# Patient Record
Sex: FEMALE | Race: WHITE | NOT HISPANIC OR LATINO | Employment: FULL TIME | URBAN - METROPOLITAN AREA
[De-identification: names, ages, dates, MRNs, and addresses within clinical notes are randomized per-mention and may not be internally consistent; named-entity substitution may affect disease eponyms.]

---

## 2017-06-29 ENCOUNTER — HOSPITAL ENCOUNTER (OUTPATIENT)
Dept: MAMMOGRAPHY | Facility: CLINIC | Age: 42
Discharge: HOME | End: 2017-06-29
Attending: NURSE PRACTITIONER | Admitting: NURSE PRACTITIONER
Payer: COMMERCIAL

## 2017-06-29 DIAGNOSIS — Z12.31 VISIT FOR SCREENING MAMMOGRAM: ICD-10-CM

## 2017-06-29 PROCEDURE — 77063 BREAST TOMOSYNTHESIS BI: CPT

## 2017-06-29 PROCEDURE — G0202 SCR MAMMO BI INCL CAD: HCPCS

## 2017-09-07 ENCOUNTER — PRE VISIT (OUTPATIENT)
Dept: SURGERY | Facility: CLINIC | Age: 42
End: 2017-09-07

## 2017-09-07 NOTE — TELEPHONE ENCOUNTER
1.  Date/reason for appt:  9/12/17   Hemorroids    2.  Referring provider:  Self    3.  Call to patient (Yes / No - short description):  No told  no outside records    Records reviewed.  All records are in Epic

## 2017-09-12 ENCOUNTER — OFFICE VISIT (OUTPATIENT)
Dept: SURGERY | Facility: CLINIC | Age: 42
End: 2017-09-12

## 2017-09-12 VITALS
BODY MASS INDEX: 20.7 KG/M2 | WEIGHT: 98.6 LBS | HEIGHT: 58 IN | SYSTOLIC BLOOD PRESSURE: 103 MMHG | DIASTOLIC BLOOD PRESSURE: 61 MMHG | HEART RATE: 81 BPM | TEMPERATURE: 98.6 F | OXYGEN SATURATION: 100 %

## 2017-09-12 DIAGNOSIS — K60.2 ANAL FISSURE: Primary | ICD-10-CM

## 2017-09-12 RX ORDER — ESCITALOPRAM OXALATE 10 MG/1
20 TABLET ORAL
COMMUNITY
Start: 2005-01-01 | End: 2024-01-11

## 2017-09-12 ASSESSMENT — ENCOUNTER SYMPTOMS
RECTAL PAIN: 1
INSOMNIA: 0
HOT FLASHES: 0
DECREASED LIBIDO: 1
NAUSEA: 0
DIARRHEA: 0
BLOATING: 0
BLOOD IN STOOL: 0
BOWEL INCONTINENCE: 0
HEARTBURN: 0
RECTAL BLEEDING: 1
DECREASED CONCENTRATION: 1
CONSTIPATION: 0
PANIC: 0
DEPRESSION: 1
VOMITING: 0
JAUNDICE: 0
ABDOMINAL PAIN: 0
NERVOUS/ANXIOUS: 1

## 2017-09-12 ASSESSMENT — PAIN SCALES - GENERAL: PAINLEVEL: MILD PAIN (3)

## 2017-09-12 NOTE — MR AVS SNAPSHOT
After Visit Summary   9/12/2017    Yolette Hayward    MRN: [id number]           Patient Information     Date Of Birth          1975        Visit Information        Provider Department      9/12/2017 2:30 PM Essence Hodge APRN [facility] Colon and Rectal Surgery        Today's Diagnoses     Anal fissure    -  1      Care Instructions    1. Diltiazem ointment 2% to be applied 3 times daily for 8 weeks  2. Fiber supplement such as Metamcuil, Citrucel, or Benefiber once to twice a day  3. MiraLax or Milk of Magnesia if still constipated  4. Drink 10 glasses of water a day  5. Tylenol, ibuprofen, and warm tub baths/sitz baths for pain  6. Follow up in 8 weeks. Follow up sooner if symptoms do not improve after 4 weeks.            Follow-ups after your visit        Who to contact     Please call your clinic at [phone] to:    Ask questions about your health    Make or cancel appointments    Discuss your medicines    Learn about your test results    Speak to your doctor   If you have compliments or concerns about an experience at your clinic, or if you wish to file a complaint, please contact [facility] Patient Relations at [phone] or email us at [email or web address]         Additional Information About Your Visit        [employer] Information     [employer] gives you secure access to your electronic health record. If you see a primary care provider, you can also send messages to your care team and make appointments. If you have questions, please call your primary care clinic.  If you do not have a primary care provider, please call [phone] and they will assist you.      [employer] is an electronic gateway that provides easy, online access to your medical records. With [employer], you can request a clinic appointment, read your test results, renew a prescription or communicate with your care team.     To access your existing account, please contact  "your [facility] or call [phone] for assistance.        Care EveryWhere ID     This is your Care EveryWhere ID. This could be used by other organizations to access your [place] medical records  [id number]        Your Vitals Were     Pulse Temperature Height Pulse Oximetry BMI (Body Mass Index)       81 98.6  F (37  C) (Oral) 4' 10\" 100% 20.61 kg/m2        Blood Pressure from Last 3 Encounters:   09/12/17 103/61   07/19/16 104/66    Weight from Last 3 Encounters:   09/12/17 98 lb 9.6 oz              Today, you had the following     No orders found for display         Today's Medication Changes          These changes are accurate as of: 9/12/17  2:52 PM.  If you have any questions, ask your nurse or doctor.               Start taking these medicines.        Dose/Directions    diltiazem 2% in PLO cream (FV COMPOUNDED) 2% Gel   Used for:  Anal fissure   Started by:  Essence Hodge, APRN CNP        To anal opening three times daily.  Use a pea-sized amount.  Store at room temperature.   Quantity:  60 g   Refills:  0            Where to get your medicines      These medications were sent to [facility] - [address]  [address] [facility] [address]     Phone:  [phone]     diltiazem 2% in PLO cream (FV COMPOUNDED) 2% Gel                Primary Care Provider Office Phone # Fax #    Rose GUERITA Russ [phone]       [facility] [address] [facility] [address]  [facility] [address]        Equal Access to Services     HORACE PRAKASH AH: Hadii aad ku hadasho Soomaali, waaxda luqadaha, qaybta kaalmada adetulioyamaggie, lo brito. So [facility] [phone].    ATENCIÓN: Si habla español, tiene a wong disposición servicios gratuitos de asistencia lingüística. Llame al [phone].    We comply with applicable federal civil rights laws and [place] laws. We do not discriminate " on the basis of race, color, national origin, age, disability sex, sexual orientation or gender identity.            Thank you!     Thank you for choosing [facility] COLON AND RECTAL SURGERY  for your care. Our goal is always to provide you with excellent care. Hearing back from our patients is one way we can continue to improve our services. Please take a few minutes to complete the written survey that you may receive in the mail after your visit with us. Thank you!             Your Updated Medication List - Protect others around you: Learn how to safely use, store and throw away your medicines at www.disposemymeds.org.          This list is accurate as of: 9/12/17  2:52 PM.  Always use your most recent med list.                   Brand Name Dispense Instructions for use Diagnosis    diltiazem 2% in PLO cream (FV COMPOUNDED) 2% Gel     60 g    To anal opening three times daily.  Use a pea-sized amount.  Store at room temperature.    Anal fissure       escitalopram 10 MG tablet    LEXAPRO          FISH OIL PO

## 2017-09-12 NOTE — PATIENT INSTRUCTIONS
1. Diltiazem ointment 2% to be applied 3 times daily for 8 weeks  2. Fiber supplement such as Metamcuil, Citrucel, or Benefiber once to twice a day  3. MiraLax or Milk of Magnesia if still constipated  4. Drink 10 glasses of water a day  5. Tylenol, ibuprofen, and warm tub baths/sitz baths for pain  6. Follow up in 8 weeks. Follow up sooner if symptoms do not improve after 4 weeks.

## 2017-09-12 NOTE — NURSING NOTE
"Chief Complaint   Patient presents with     Clinic Care Coordination - Initial     New pt consult, painful hemorhoids.        Vitals:    09/12/17 1432   BP: 103/61   BP Location: Left arm   Patient Position: Chair   Cuff Size: Adult Regular   Pulse: 81   Temp: 98.6  F (37  C)   TempSrc: Oral   SpO2: 100%   Weight: 98 lb 9.6 oz   Height: 4' 10\"       Body mass index is 20.61 kg/(m^2).    Aminata SORIANO LPN                  "

## 2017-09-12 NOTE — LETTER
2017      RE: Yolette Hayward  [address]  [facility] [address]       Colon and Rectal Surgery Consult Clinic Note    Date: 2017     Referring provider:  Referred Self, MD  No address on file     RE: Yolette Hayward  : 1975  YOSHI: 2017    Yolette Hayward is a very pleasant 42 year old female without a significant past medical history with a recent diagnosis of anal pain.  Given these findings they were subsequently sent to the Colon and Rectal Surgery Clinic for an opinion on this and a new patient consultation.     Yolette reports developing pain with bowel movements in  when she was doing a lot of biking and with straining with bowel movements. She developed a small skin tag at that point. Her pain then resolved but recurred again when she was pregnant about 2 years ago. She did not give birth but had an . She states that since the pregnancy she has had some sharp pain with bowel movements and then pain that lasts for a few hours after bowel movements. She has tried increasing her dietary fiber and using a hemorrhoid cream without improvement in symptoms. She notices a small amount of bright red blood with bowel movements but states that this is very minimal. Her bowel movements are now quite soft.    Assessment/Plan: 42 year old female with anal fissure. On exam she has an anterior midline anal fissure with associated skin tag. Superficial posterior midline anal fissure as well. Discussed the importance of keeping stools soft and easy to pass while healing fissure. Recommended she continue on a high-fiber diet, drink a lot of water, and add in a daily fiber supplement. She can additionally take a laxative as needed for hard stools. Will treat with topical diltiazem with follow up in 8 weeks. Discussed that it may take several weeks for symptoms to improve. If no improvement is noted after 4 weeks, return to clinic and discuss further intervention such as Botox injections or  "sphincterotomy.   Patient's questions were answered to her stated satisfaction and she is in agreement with this plan.    Medical history:  No past medical history on file.    Surgical history:  No past surgical history on file.    Problem list:  There are no active problems to display for this patient.      Medications:  Current Outpatient Prescriptions   Medication Sig Dispense Refill     escitalopram (LEXAPRO) 10 MG tablet        Omega-3 Fatty Acids (FISH OIL PO)        diltiazem 2% in PLO cream, FV COMPOUNDED, 2% GEL To anal opening three times daily.  Use a pea-sized amount.  Store at room temperature. 60 g 0       Allergies:  Allergies   Allergen Reactions     Wellbutrin [Bupropion]        Family history:  No family history on file.    Social history:  Social History   Substance Use Topics     Smoking status: Never Smoker     Smokeless tobacco: Not on file     Alcohol use Not on file    Marital status: single.  Occupation: lab tech    Nursing Notes:   Eddie Marx LPN  9/12/2017  2:36 PM  Signed  Chief Complaint   Patient presents with     Clinic Care Coordination - Initial     New pt consult, painful hemorhoids.        Vitals:    09/12/17 1432   BP: 103/61   BP Location: Left arm   Patient Position: Chair   Cuff Size: Adult Regular   Pulse: 81   Temp: 98.6  F (37  C)   TempSrc: Oral   SpO2: 100%   Weight: 98 lb 9.6 oz   Height: 4' 10\"       Body mass index is 20.61 kg/(m^2).    Aminata SORINAO LPN                     Physical Examination:  /61 (BP Location: Left arm, Patient Position: Chair, Cuff Size: Adult Regular)  Pulse 81  Temp 98.6  F (37  C) (Oral)  Ht 4' 10\"  Wt 98 lb 9.6 oz  SpO2 100%  BMI 20.61 kg/m2  General: alert, oriented, in no acute distress, sitting comfortably  HEENT: mucous membranes moist  Perianal external examination:  Perianal skin: Intact with no excoriation or lichenification.  Lesions: No evidence of an external lesion, nodularity, or induration in the perianal " region.  Eversion of buttocks: There was evidence of an anal fissure. Details: anterior midline anal fissure with associated skin tag. Superficial posterior midline anal fissure. No active bleeding.  Digital rectal examination: Was deferred in order not to cause further trauma    Anoscopy: Was deferred in order not to cause further trauma    Total face to face time was 20 minutes, >50% counseling.    GUERITA Gonzalez, NP-C  Colon and Rectal Surgery   [facility]    This note was created using speech recognition software and may contain unintended word substitutions.      GUERITA Gonzalez CNP

## 2017-09-12 NOTE — PROGRESS NOTES
Colon and Rectal Surgery Consult Clinic Note    Date: 2017     Referring provider:  Referred Self, MD  No address on file     RE: Yolette Hayward  : 1975  YOSHI: 2017    Yolette Hayward is a very pleasant 42 year old female without a significant past medical history with a recent diagnosis of anal pain.  Given these findings they were subsequently sent to the Colon and Rectal Surgery Clinic for an opinion on this and a new patient consultation.     Yolette reports developing pain with bowel movements in  when she was doing a lot of biking and with straining with bowel movements. She developed a small skin tag at that point. Her pain then resolved but recurred again when she was pregnant about 2 years ago. She did not give birth but had an . She states that since the pregnancy she has had some sharp pain with bowel movements and then pain that lasts for a few hours after bowel movements. She has tried increasing her dietary fiber and using a hemorrhoid cream without improvement in symptoms. She notices a small amount of bright red blood with bowel movements but states that this is very minimal. Her bowel movements are now quite soft.    Assessment/Plan: 42 year old female with anal fissure. On exam she has an anterior midline anal fissure with associated skin tag. Superficial posterior midline anal fissure as well. Discussed the importance of keeping stools soft and easy to pass while healing fissure. Recommended she continue on a high-fiber diet, drink a lot of water, and add in a daily fiber supplement. She can additionally take a laxative as needed for hard stools. Will treat with topical diltiazem with follow up in 8 weeks. Discussed that it may take several weeks for symptoms to improve. If no improvement is noted after 4 weeks, return to clinic and discuss further intervention such as Botox injections or sphincterotomy.   Patient's questions were answered to her stated satisfaction and she is  "in agreement with this plan.    Medical history:  No past medical history on file.    Surgical history:  No past surgical history on file.    Problem list:  There are no active problems to display for this patient.      Medications:  Current Outpatient Prescriptions   Medication Sig Dispense Refill     escitalopram (LEXAPRO) 10 MG tablet        Omega-3 Fatty Acids (FISH OIL PO)        diltiazem 2% in PLO cream, FV COMPOUNDED, 2% GEL To anal opening three times daily.  Use a pea-sized amount.  Store at room temperature. 60 g 0       Allergies:  Allergies   Allergen Reactions     Wellbutrin [Bupropion]        Family history:  No family history on file.    Social history:  Social History   Substance Use Topics     Smoking status: Never Smoker     Smokeless tobacco: Not on file     Alcohol use Not on file    Marital status: single.  Occupation: lab tech    Nursing Notes:   Eddie Marx LPN  9/12/2017  2:36 PM  Signed  Chief Complaint   Patient presents with     Clinic Care Coordination - Initial     New pt consult, painful hemorhoids.        Vitals:    09/12/17 1432   BP: 103/61   BP Location: Left arm   Patient Position: Chair   Cuff Size: Adult Regular   Pulse: 81   Temp: 98.6  F (37  C)   TempSrc: Oral   SpO2: 100%   Weight: 98 lb 9.6 oz   Height: 4' 10\"       Body mass index is 20.61 kg/(m^2).    Aminata SORIANO LPN                     Physical Examination:  /61 (BP Location: Left arm, Patient Position: Chair, Cuff Size: Adult Regular)  Pulse 81  Temp 98.6  F (37  C) (Oral)  Ht 4' 10\"  Wt 98 lb 9.6 oz  SpO2 100%  BMI 20.61 kg/m2  General: alert, oriented, in no acute distress, sitting comfortably  HEENT: mucous membranes moist  Perianal external examination:  Perianal skin: Intact with no excoriation or lichenification.  Lesions: No evidence of an external lesion, nodularity, or induration in the perianal region.  Eversion of buttocks: There was evidence of an anal fissure. Details: anterior midline anal " fissure with associated skin tag. Superficial posterior midline anal fissure. No active bleeding.  Digital rectal examination: Was deferred in order not to cause further trauma    Anoscopy: Was deferred in order not to cause further trauma    Total face to face time was 20 minutes, >50% counseling.    GUERITA Gonzalez, NP-C  Colon and Rectal Surgery   [facility]    This note was created using speech recognition software and may contain unintended word substitutions.

## 2017-09-16 ENCOUNTER — HEALTH MAINTENANCE LETTER (OUTPATIENT)
Age: 42
End: 2017-09-16

## 2017-12-14 ENCOUNTER — OFFICE VISIT (OUTPATIENT)
Dept: SURGERY | Facility: CLINIC | Age: 42
End: 2017-12-14
Payer: COMMERCIAL

## 2017-12-14 VITALS
OXYGEN SATURATION: 96 % | WEIGHT: 103.6 LBS | HEIGHT: 58 IN | DIASTOLIC BLOOD PRESSURE: 68 MMHG | SYSTOLIC BLOOD PRESSURE: 108 MMHG | HEART RATE: 67 BPM | BODY MASS INDEX: 21.75 KG/M2

## 2017-12-14 DIAGNOSIS — K60.2 ANAL FISSURE: ICD-10-CM

## 2017-12-14 DIAGNOSIS — K59.09 OTHER CONSTIPATION: Primary | ICD-10-CM

## 2017-12-14 ASSESSMENT — PAIN SCALES - GENERAL: PAINLEVEL: NO PAIN (0)

## 2017-12-14 NOTE — MR AVS SNAPSHOT
"              After Visit Summary   12/14/2017    Yolette Hayward    MRN: [id number]           Patient Information     Date Of Birth          1975        Visit Information        Provider Department      12/14/2017 12:00 PM Essence Hodge APRN [facility]al [facility]        Today's Diagnoses     Other constipation    -  1    Anal fissure           Follow-ups after your visit        Who to contact     Please call your clinic at [phone] to:    Ask questions about your health    Make or cancel appointments    Discuss your medicines    Learn about your test results    Speak to your doctor   If you have compliments or concerns about an experience at your clinic, or if you wish to file a complaint, please contact [facility] Patient Relations at [phone] or email us at [email or web address]         Additional Information About Your Visit        MyChart Information     [employer] gives you secure access to your electronic health record. If you see a primary care provider, you can also send messages to your care team and make appointments. If you have questions, please call your primary care clinic.  If you do not have a primary care provider, please call [phone] and they will assist you.      [employer] is an electronic gateway that provides easy, online access to your medical records. With [employer], you can request a clinic appointment, read your test results, renew a prescription or communicate with your care team.     To access your existing account, please contact your [facility] or call [phone] for assistance.        Care EveryWhere ID     This is your Care EveryWhere ID. This could be used by other organizations to access your [place] medical records  [id number]        Your Vitals Were     Pulse Height Pulse Oximetry BMI (Body Mass Index)          67 4' 10\" 96% 21.65 kg/m2         Blood Pressure from Last " 3 Encounters:   12/14/17 108/68   09/12/17 103/61   07/19/16 104/66    Weight from Last 3 Encounters:   12/14/17 103 lb 9.6 oz   09/12/17 98 lb 9.6 oz              Today, you had the following     No orders found for display       Primary Care Provider Office Phone # Fax #    GUERITA Castañeda [phone]       [facility] [address]  [facility] [address]        Equal Access to Services     [facility]: Hadii aad ku hadasho Soomaali, waaxda luqadaha, qaybta kaalmada adeegyada, waxay zulmain hayjosen mayito monteiro . So [facility] [phone].    ATENCIÓN: Si habla español, tiene a wong disposición servicios gratuitos de asistencia lingüística. Llame al [phone].    We comply with applicable federal civil rights laws and [place] laws. We do not discriminate on the basis of race, color, national origin, age, disability, sex, sexual orientation, or gender identity.            Thank you!     Thank you for choosing [facility] COLON AND RECTAL SURGERY  for your care. Our goal is always to provide you with excellent care. Hearing back from our patients is one way we can continue to improve our services. Please take a few minutes to complete the written survey that you may receive in the mail after your visit with us. Thank you!             Your Updated Medication List - Protect others around you: Learn how to safely use, store and throw away your medicines at www.disposemymeds.org.          This list is accurate as of: 12/14/17  1:56 PM.  Always use your most recent med list.                   Brand Name Dispense Instructions for use Diagnosis    escitalopram 10 MG tablet    LEXAPRO          FISH OIL PO

## 2017-12-14 NOTE — NURSING NOTE
"Chief Complaint   Patient presents with     Clinic Care Coordination - Follow-up     return       Vitals:    12/14/17 1215   BP: 108/68   Pulse: 67   SpO2: 96%   Weight: 103 lb 9.6 oz   Height: 4' 10\"       Body mass index is 21.65 kg/(m^2).    Shaylee RODRIGUEZ LPN                  "

## 2017-12-14 NOTE — PROGRESS NOTES
Colon and Rectal Surgery Follow Up Clinic Note    Referring provider:  Referred Self, MD  No address on file     RE: Yolette Hayward  : 1975  YOSHI: 2017    Yolette Hayward is a very pleasant 42 year old female without a significant past medical history who was seen in clinic in September of this year with an anal fissure.  She presents today for follow-up.    Yolette felt that all of her symptoms resolved after 6 weeks of diltiazem use.  She then stopped using it and believes she developed another anal fissure.  She tried using it again but got severe itching with the diltiazem.  She is having intermittent pain now with bowel movements but this is much less than it was previously.  She has not noticed any further bleeding.  She reports that her bowel movements are not as soft as she thinks they should be she goes once a day but sometimes will go every other day or every 2 days.  Is taking Benefiber twice a day and has increased her dietary fiber.    Assessment/Plan: No open fissure on exam today.  I think it is likely that she superficially opens a fissure with harder bowel movements.  We again discussed the importance of managing constipation long-term.  In addition to the Benefiber I would recommend that she try a dose of daily MiraLAX and increase her water and fiber intake.  If pain returns could consider topical diltiazem compounded and petroleum to see if this will improve the local irritation with the diltiazem. Also recommended warm tub baths, tylenol, and ibuprofen for any pain. We will have her return to clinic as needed if she is unable to manage her symptoms with constipation management.  Patient's questions were answered to her stated satisfaction and she is in agreement with this plan.    Medical history:  No past medical history on file.    Surgical history:  No past surgical history on file.    Problem list:  There are no active problems to display for this patient.      Medications:  Current  "Outpatient Prescriptions   Medication Sig Dispense Refill     escitalopram (LEXAPRO) 10 MG tablet        Omega-3 Fatty Acids (FISH OIL PO)          Allergies:  Allergies   Allergen Reactions     Wellbutrin [Bupropion]        Family history:  No family history on file.    Social history:  Social History   Substance Use Topics     Smoking status: Never Smoker     Smokeless tobacco: Not on file     Alcohol use Not on file    Marital status: single.  Occupation: lab tech    Nursing Notes:   Shaylee Frederick, LPN  12/14/2017 12:16 PM  Signed  Chief Complaint   Patient presents with     Clinic Care Coordination - Follow-up     return       Vitals:    12/14/17 1215   BP: 108/68   Pulse: 67   SpO2: 96%   Weight: 103 lb 9.6 oz   Height: 4' 10\"       Body mass index is 21.65 kg/(m^2).    Shaylee RODRIGUEZ ZULEMA                     Physical Examination:  /68  Pulse 67  Ht 4' 10\"  Wt 103 lb 9.6 oz  SpO2 96%  BMI 21.65 kg/m2  General: alert, oriented, in no acute distress, sitting comfortably  HEENT: mucous membranes moist  Perianal external examination:  Perianal skin: Intact with no excoriation or lichenification.  Lesions: No evidence of an external lesion, nodularity, or induration in the perianal region.  Eversion of buttocks: There was evidence of an anal fissure. Details: anterior midline anal fissure with associated skin tag. Superficial posterior midline anal fissure. No active bleeding.  Digital rectal examination: Was deferred in order not to cause further trauma    Anoscopy: Was deferred in order not to cause further trauma    Total face to face time was 20 minutes, >50% counseling.    GUERITA Gonzalez, NP-C  Colon and Rectal Surgery   [facility]    This note was created using speech recognition software and may contain unintended word substitutions.    "

## 2018-08-20 ENCOUNTER — OFFICE VISIT (OUTPATIENT)
Dept: URGENT CARE | Facility: URGENT CARE | Age: 43
End: 2018-08-20
Payer: COMMERCIAL

## 2018-08-20 ENCOUNTER — RADIANT APPOINTMENT (OUTPATIENT)
Dept: GENERAL RADIOLOGY | Facility: CLINIC | Age: 43
End: 2018-08-20
Attending: FAMILY MEDICINE
Payer: COMMERCIAL

## 2018-08-20 VITALS
HEART RATE: 99 BPM | TEMPERATURE: 98.2 F | OXYGEN SATURATION: 100 % | DIASTOLIC BLOOD PRESSURE: 58 MMHG | SYSTOLIC BLOOD PRESSURE: 102 MMHG

## 2018-08-20 DIAGNOSIS — R07.1 PAINFUL RESPIRATION: ICD-10-CM

## 2018-08-20 DIAGNOSIS — R06.02 SOB (SHORTNESS OF BREATH): ICD-10-CM

## 2018-08-20 DIAGNOSIS — M54.6 ACUTE BILATERAL THORACIC BACK PAIN: Primary | ICD-10-CM

## 2018-08-20 LAB
BASOPHILS # BLD AUTO: 0 10E9/L (ref 0–0.2)
BASOPHILS NFR BLD AUTO: 0.2 %
D DIMER PPP FEU-MCNC: 0.3 UG/ML FEU (ref 0–0.5)
DIFFERENTIAL METHOD BLD: NORMAL
EOSINOPHIL # BLD AUTO: 0.1 10E9/L (ref 0–0.7)
EOSINOPHIL NFR BLD AUTO: 1.2 %
ERYTHROCYTE [DISTWIDTH] IN BLOOD BY AUTOMATED COUNT: 12.8 % (ref 10–15)
HCT VFR BLD AUTO: 38.2 % (ref 35–47)
HGB BLD-MCNC: 12.6 G/DL (ref 11.7–15.7)
LYMPHOCYTES # BLD AUTO: 1 10E9/L (ref 0.8–5.3)
LYMPHOCYTES NFR BLD AUTO: 14.4 %
MCH RBC QN AUTO: 30.2 PG (ref 26.5–33)
MCHC RBC AUTO-ENTMCNC: 33 G/DL (ref 31.5–36.5)
MCV RBC AUTO: 92 FL (ref 78–100)
MONOCYTES # BLD AUTO: 0.5 10E9/L (ref 0–1.3)
MONOCYTES NFR BLD AUTO: 8.1 %
NEUTROPHILS # BLD AUTO: 5 10E9/L (ref 1.6–8.3)
NEUTROPHILS NFR BLD AUTO: 76.1 %
PLATELET # BLD AUTO: 238 10E9/L (ref 150–450)
RBC # BLD AUTO: 4.17 10E12/L (ref 3.8–5.2)
TROPONIN I SERPL-MCNC: <0.015 UG/L (ref 0–0.04)
TSH SERPL DL<=0.005 MIU/L-ACNC: 0.79 MU/L (ref 0.4–4)
WBC # BLD AUTO: 6.6 10E9/L (ref 4–11)

## 2018-08-20 PROCEDURE — 85379 FIBRIN DEGRADATION QUANT: CPT | Performed by: FAMILY MEDICINE

## 2018-08-20 PROCEDURE — 99203 OFFICE O/P NEW LOW 30 MIN: CPT | Performed by: FAMILY MEDICINE

## 2018-08-20 PROCEDURE — 93000 ELECTROCARDIOGRAM COMPLETE: CPT | Performed by: FAMILY MEDICINE

## 2018-08-20 PROCEDURE — 84443 ASSAY THYROID STIM HORMONE: CPT | Performed by: FAMILY MEDICINE

## 2018-08-20 PROCEDURE — 71046 X-RAY EXAM CHEST 2 VIEWS: CPT | Mod: FY

## 2018-08-20 PROCEDURE — 84484 ASSAY OF TROPONIN QUANT: CPT | Performed by: FAMILY MEDICINE

## 2018-08-20 PROCEDURE — 36415 COLL VENOUS BLD VENIPUNCTURE: CPT | Performed by: FAMILY MEDICINE

## 2018-08-20 PROCEDURE — 85025 COMPLETE CBC W/AUTO DIFF WBC: CPT | Performed by: FAMILY MEDICINE

## 2018-08-20 NOTE — PROGRESS NOTES
SUBJECTIVE: Yolette Hayward is a 43 year old female presenting with a chief complaint of bilateral upper back pain, SOB in middle of chest and fatigue.  Onset of symptoms was day(s) ago.  Course of illness is same.    Severity moderate  Current and Associated symptoms: none  Treatment measures tried include None tried.  Predisposing factors include None.  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    No past surgical history on file.    No family history on file.    Social History   Substance Use Topics     Smoking status: Never Smoker     Smokeless tobacco: Never Used     Alcohol use Not on file     ROS:  SKIN: no rash  GI: no vomiting    OBJECTIVE:  /58  Pulse 99  Temp 98.2  F (36.8  C) (Oral)  SpO2 100%GENERAL APPEARANCE: healthy, alert and no distress  EYES: EOMI,  PERRL, conjunctiva clear  HENT: ear canals and TM's normal.  Nose and mouth without ulcers, erythema or lesions  NECK: supple, nontender, no lymphadenopathy  RESP: lungs clear to auscultation - no rales, rhonchi or wheezes  CV: regular rates and rhythm, normal S1 S2, no murmur noted  NEURO: Normal strength and tone, sensory exam grossly normal,  normal speech and mentation  SKIN: no suspicious lesions or rashes  Bilateral upper back pain with palpation and rom    Xray without acute findings, read by Venkatesh Rogers D.O.    EKG no acute changes, read by Dr. Venkatesh Rogers      ICD-10-CM    1. Acute bilateral thoracic back pain M54.6 Troponin I     D dimer, quantitative     CBC with platelets differential     EKG 12-lead complete w/read - Clinics     XR Chest 2 Views     TSH with free T4 reflex   2. SOB (shortness of breath) R06.02 Troponin I     D dimer, quantitative     CBC with platelets differential     EKG 12-lead complete w/read - Clinics     XR Chest 2 Views     TSH with free T4 reflex   3. Painful respiration R07.1 Troponin I     D dimer, quantitative     CBC with platelets  differential     EKG 12-lead complete w/read - Clinics     XR Chest 2 Views     TSH with free T4 reflex      OTC anti inflammatories  Fluids/Rest, f/u if worse/not any better

## 2018-08-20 NOTE — MR AVS SNAPSHOT
After Visit Summary   8/20/2018    Yolette Hayward    MRN: [id number]           Patient Information     Date Of Birth          1975        Visit Information        Provider Department      8/20/2018 4:10 PM Venkatesh Rogers DO [facility]        Today's Diagnoses     Acute bilateral thoracic back pain    -  1    SOB (shortness of breath)        Painful respiration           Follow-ups after your visit        Who to contact     If you have questions or need follow up information about today's clinic visit or your schedule please contact [facility] directly at [phone].  Normal or non-critical lab and imaging results will be communicated to you by MyChart, letter or phone within 4 business days after the clinic has received the results. If you do not hear from us within 7 days, please contact the clinic through [employer] or phone. If you have a critical or abnormal lab result, we will notify you by phone as soon as possible.  Submit refill requests through [employer] or call your pharmacy and they will forward the refill request to us. Please allow 3 business days for your refill to be completed.          Additional Information About Your Visit        MyChart Information     [employer] gives you secure access to your electronic health record. If you see a primary care provider, you can also send messages to your care team and make appointments. If you have questions, please call your primary care clinic.  If you do not have a primary care provider, please call [phone] and they will assist you.        Care EveryWhere ID     This is your Care EveryWhere ID. This could be used by other organizations to access your [facility] medical records  [id number]        Your Vitals Were     Pulse Temperature Pulse Oximetry             99 98.2  F (36.8  C) (Oral) 100%          Blood Pressure from Last 3 Encounters:   08/20/18 102/58   12/14/17 108/68    09/12/17 103/61    Weight from Last 3 Encounters:   12/14/17 103 lb 9.6 oz (47 kg)   09/12/17 98 lb 9.6 oz (44.7 kg)              We Performed the Following     CBC with platelets differential     D dimer, quantitative     EKG 12-lead complete w/read - Clinics     Troponin I     TSH with free T4 reflex     XR Chest 2 Views        Primary Care Provider Office Phone # Fax #    GUERITA Rojo [phone]       [facility] [address]  [place] [address]        Equal Access to Services     [facility]: Hadii aad ku hadasho Soomaali, waaxda luqadaha, qaybta kaalmada adeegyada, lo lange haydave monteiro . So [facility] [phone].    ATENCIÓN: Si habla español, tiene a wong disposición servicios gratuitos de asistencia lingüística. Llame al [phone].    We comply with applicable federal civil rights laws and [place] laws. We do not discriminate on the basis of race, color, national origin, age, disability, sex, sexual orientation, or gender identity.            Thank you!     Thank you for choosing [facility]  for your care. Our goal is always to provide you with excellent care. Hearing back from our patients is one way we can continue to improve our services. Please take a few minutes to complete the written survey that you may receive in the mail after your visit with us. Thank you!             Your Updated Medication List - Protect others around you: Learn how to safely use, store and throw away your medicines at www.disposemymeds.org.          This list is accurate as of 8/20/18  6:19 PM.  Always use your most recent med list.                   Brand Name Dispense Instructions for use Diagnosis    B-COMPLEX PO           escitalopram 10 MG tablet    LEXAPRO     20 mg        FISH OIL PO

## 2019-11-06 ENCOUNTER — HEALTH MAINTENANCE LETTER (OUTPATIENT)
Age: 44
End: 2019-11-06

## 2020-11-29 ENCOUNTER — HEALTH MAINTENANCE LETTER (OUTPATIENT)
Age: 45
End: 2020-11-29

## 2021-03-19 ENCOUNTER — HOSPITAL ENCOUNTER (EMERGENCY)
Facility: CLINIC | Age: 46
Discharge: HOME | End: 2021-03-19
Attending: PHYSICIAN ASSISTANT | Admitting: PHYSICIAN ASSISTANT
Payer: COMMERCIAL

## 2021-03-19 VITALS
TEMPERATURE: 98.1 F | DIASTOLIC BLOOD PRESSURE: 72 MMHG | HEIGHT: 58 IN | BODY MASS INDEX: 20.99 KG/M2 | HEART RATE: 93 BPM | RESPIRATION RATE: 18 BRPM | WEIGHT: 100 LBS | OXYGEN SATURATION: 100 % | SYSTOLIC BLOOD PRESSURE: 136 MMHG
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
## 2021-03-19 DIAGNOSIS — S06.0X0A CONCUSSION WITHOUT LOSS OF CONSCIOUSNESS, INITIAL ENCOUNTER: ICD-10-CM

## 2021-03-19 PROCEDURE — 99283 EMERGENCY DEPT VISIT LOW MDM: CPT

## 2021-03-19 RX ORDER — CYCLOBENZAPRINE HCL 10 MG
10 TABLET ORAL 2 TIMES DAILY PRN
Qty: 10 TABLET | Refills: 0 | Status: SHIPPED | OUTPATIENT
Start: 2021-03-19 | End: 2021-03-24

## 2021-03-19 ASSESSMENT — ENCOUNTER SYMPTOMS
ABDOMINAL PAIN: 1
WEAKNESS: 0
NECK PAIN: 1
VOMITING: 0
HEADACHES: 1
NAUSEA: 0

## 2021-03-19 ASSESSMENT — MIFFLIN-ST. JEOR: SCORE: 993.35

## 2021-03-19 NOTE — LETTER
March 19, 2021      To Whom It May Concern:      Yolette Hayward was seen in our Emergency Department today, 03/19/21.  I expect her condition to improve over the next 3 days.  She may return to work when improved.    Sincerely,        Gilberto Marquis PA-C
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
## 2021-03-26 ENCOUNTER — OFFICE VISIT - HEALTHEAST (OUTPATIENT)
Dept: PHYSICAL THERAPY | Facility: REHABILITATION | Age: 46
End: 2021-03-26

## 2021-03-26 DIAGNOSIS — M54.50 MIDLINE LOW BACK PAIN WITHOUT SCIATICA: ICD-10-CM

## 2021-03-26 DIAGNOSIS — M54.9 UPPER BACK PAIN ON LEFT SIDE: ICD-10-CM

## 2021-03-26 DIAGNOSIS — G44.209 TENSION HEADACHE: ICD-10-CM

## 2021-03-26 DIAGNOSIS — M54.2 NECK PAIN ON LEFT SIDE: ICD-10-CM

## 2021-04-01 ENCOUNTER — OFFICE VISIT - HEALTHEAST (OUTPATIENT)
Dept: PHYSICAL THERAPY | Facility: REHABILITATION | Age: 46
End: 2021-04-01

## 2021-04-01 DIAGNOSIS — M54.2 NECK PAIN ON LEFT SIDE: ICD-10-CM

## 2021-04-01 DIAGNOSIS — G44.209 TENSION HEADACHE: ICD-10-CM

## 2021-04-01 DIAGNOSIS — M54.9 UPPER BACK PAIN ON LEFT SIDE: ICD-10-CM

## 2021-04-10 ENCOUNTER — HEALTH MAINTENANCE LETTER (OUTPATIENT)
Age: 46
End: 2021-04-10

## 2021-04-13 ENCOUNTER — OFFICE VISIT - HEALTHEAST (OUTPATIENT)
Dept: PHYSICAL THERAPY | Facility: REHABILITATION | Age: 46
End: 2021-04-13

## 2021-04-13 DIAGNOSIS — M54.2 NECK PAIN ON LEFT SIDE: ICD-10-CM

## 2021-04-13 DIAGNOSIS — M54.9 UPPER BACK PAIN ON LEFT SIDE: ICD-10-CM

## 2021-04-13 DIAGNOSIS — G44.209 TENSION HEADACHE: ICD-10-CM

## 2021-04-15 ENCOUNTER — HOSPITAL ENCOUNTER (OUTPATIENT)
Dept: MAMMOGRAPHY | Facility: CLINIC | Age: 46
Discharge: HOME | End: 2021-04-15
Attending: NURSE PRACTITIONER | Admitting: NURSE PRACTITIONER
Payer: COMMERCIAL

## 2021-04-15 DIAGNOSIS — Z12.31 VISIT FOR SCREENING MAMMOGRAM: ICD-10-CM

## 2021-04-15 PROCEDURE — 77063 BREAST TOMOSYNTHESIS BI: CPT

## 2021-04-23 ENCOUNTER — OFFICE VISIT - HEALTHEAST (OUTPATIENT)
Dept: PHYSICAL THERAPY | Facility: REHABILITATION | Age: 46
End: 2021-04-23

## 2021-04-23 DIAGNOSIS — G44.209 TENSION HEADACHE: ICD-10-CM

## 2021-04-23 DIAGNOSIS — M54.9 UPPER BACK PAIN ON LEFT SIDE: ICD-10-CM

## 2021-04-23 DIAGNOSIS — M54.2 NECK PAIN ON LEFT SIDE: ICD-10-CM

## 2021-04-30 ENCOUNTER — OFFICE VISIT - HEALTHEAST (OUTPATIENT)
Dept: PHYSICAL THERAPY | Facility: REHABILITATION | Age: 46
End: 2021-04-30

## 2021-04-30 DIAGNOSIS — G44.209 TENSION HEADACHE: ICD-10-CM

## 2021-04-30 DIAGNOSIS — M54.9 UPPER BACK PAIN ON LEFT SIDE: ICD-10-CM

## 2021-04-30 DIAGNOSIS — M54.2 NECK PAIN ON LEFT SIDE: ICD-10-CM
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
## 2021-06-18 NOTE — PATIENT INSTRUCTIONS - HE
Patient Instructions by Marifer Garcia PT at 3/26/2021 11:00 AM     Author: Marifer Garcia PT Service: -- Author Type: Physical Therapist    Filed: 3/26/2021 11:52 AM Encounter Date: 3/26/2021 Status: Signed    : Marifer Garcia PT (Physical Therapist)         Look at your hand   Extend your arm out to the side, slightly backward and down   As you extend your arm, turn your head away as if looking away from the arm   Get a nice, easy stretch (may get a few tingles...which is OK)   Return the hand to the front of the face       Place your palms against each other, fingers pointing to the andrea   Push your arms to the left as far as you can   Push your arms to the right as far as you can   Repeat this process     Ulnar Nerve: Butterfly    INSTRUCTIONS:  ? Place your hands on the side of your head  ? Elbows face forward  ? Push your elbows backward towards the wall (or bed if you are doing this laying down)  ? Go as far as you can until you feel a nice, easy stretch (you may get a few tingles which is OK)  ? Return elbows forward again  ? Do not hold  ? Repeat ___10-15__ times  ? Repeat ____1_ sets  ? Repeat ___2-3__ times per day  NOTE: In very painful neck patients, this exercise should be done in supine (laying down)        Place your hand over your ear, with your elbow still facing forward     Gently pull your elbow back towards the wall till you feel you have reached the end     You may feel a good stretch, ache or even a few pins and needles or tingles, which is expected     Bring the elbow forward again     Bring the arm down

## 2021-09-19 ENCOUNTER — HEALTH MAINTENANCE LETTER (OUTPATIENT)
Age: 46
End: 2021-09-19

## 2022-01-09 ENCOUNTER — HEALTH MAINTENANCE LETTER (OUTPATIENT)
Age: 47
End: 2022-01-09

## 2022-01-22 ENCOUNTER — LAB REQUISITION (OUTPATIENT)
Dept: LAB | Facility: CLINIC | Age: 47
End: 2022-01-22

## 2022-01-22 PROCEDURE — U0005 INFEC AGEN DETEC AMPLI PROBE: HCPCS | Performed by: INTERNAL MEDICINE

## 2022-01-23 LAB — SARS-COV-2 RNA RESP QL NAA+PROBE: POSITIVE

## 2022-02-02 ENCOUNTER — VIRTUAL VISIT (OUTPATIENT)
Dept: FAMILY MEDICINE | Facility: CLINIC | Age: 47
End: 2022-02-02
Payer: COMMERCIAL

## 2022-02-02 ENCOUNTER — NURSE TRIAGE (OUTPATIENT)
Dept: NURSING | Facility: CLINIC | Age: 47
End: 2022-02-02

## 2022-02-02 DIAGNOSIS — R05.9 COUGH: ICD-10-CM

## 2022-02-02 DIAGNOSIS — U07.1 COVID-19 VIRUS INFECTION: Primary | ICD-10-CM

## 2022-02-02 PROCEDURE — 99203 OFFICE O/P NEW LOW 30 MIN: CPT | Mod: 95 | Performed by: PHYSICIAN ASSISTANT

## 2022-02-02 RX ORDER — ALBUTEROL SULFATE 90 UG/1
1-2 AEROSOL, METERED RESPIRATORY (INHALATION) EVERY 4 HOURS PRN
Qty: 6.7 G | Refills: 0 | Status: SHIPPED | OUTPATIENT
Start: 2022-02-02 | End: 2023-10-31

## 2022-02-02 NOTE — LETTER
February 2, 2022      Yolette Hayward  [address]  [place] [address]        To Whom It May Concern:    Yolette Hayward was seen in our clinic. She may return to work if she is feeling improved on 2/9/22 or sooner if she is feeling up to it. Please excuse her absence.    Sincerely,        TRAVIS Rosenthal

## 2022-02-02 NOTE — PROGRESS NOTES
Yolette is a 46 year old who is being evaluated via a billable video visit.      How would you like to obtain your AVS? MyChart  If the video visit is dropped, the invitation should be resent by: Text to cell phone: [phone]  Will anyone else be joining your video visit? No    Video Start Time: 1:54 PM    Assessment & Plan   Problem List Items Addressed This Visit     None      Visit Diagnoses     COVID-19 virus infection    -  Primary    Relevant Orders    Adult Post Covid Clinic Referral    Cough        Relevant Medications    albuterol (PROAIR HFA/PROVENTIL HFA/VENTOLIN HFA) 108 (90 Base) MCG/ACT inhaler    Other Relevant Orders    Adult Post Covid Clinic Referral         Impression is ongoing symptoms from COVID-19. Appears well and non-toxic and I have low suspicion for impending airway obstruction or respiratory distress.  He will push p.o. fluids, use over-the-counter meds for symptoms, albuterol inhaler and follow-up with us in 2-4 weeks if not improving or urgent care/the ER if symptoms worsen/change at any time.    DDx and Dx discussed with and explained to the pt to their satisfaction.  All questions were answered at this time. Pt expressed understanding of and agreement with this dx, tx, and plan. No further workup warranted and standard medication warnings given. I have given the patient a list of pertinent indications for re-evaluation. Will go to the Emergency Department if symptoms worsen or new concerning symptoms arise. Patient left the call in no apparent distress.      See Patient Instructions    Return in about 1 month (around 3/2/2022) for a recheck of your symptoms if not improving, or call 911/go to an ER anytime if worsening.    TRAVIS Rosenthal  [facility] MICHAELA De La Vega is a 46 year old who presents for the following health issues     HPI   Pos for Covid 1/22/22. In bed for 3 days at the beginning of her illness with fatigue, body aches and congestion.  4 days ago started feeling better and started working the next day. Ongoing fatigue, weakness, labored breathing with exertion.    Review of Systems   Constitutional, HEENT, cardiovascular, pulmonary, gi and gu systems are negative, except as otherwise noted.      Objective           Vitals:  No vitals were obtained today due to virtual visit.    Physical Exam   GENERAL: Healthy, alert and no distress  EYES: Eyes grossly normal to inspection.  No discharge or erythema, or obvious scleral/conjunctival abnormalities.  RESP: No audible wheeze, cough, or visible cyanosis.  No visible retractions or increased work of breathing.    SKIN: Visible skin clear. No significant rash, abnormal pigmentation or lesions.  NEURO: Cranial nerves grossly intact.  Mentation and speech appropriate for age.  PSYCH: Mentation appears normal, affect normal/bright, judgement and insight intact, normal speech and appearance well-groomed.      Video-Visit Details    Type of service:  Video Visit    Video End Time:2:17 PM    Originating Location (pt. Location): Home    Distant Location (provider location):  [facility]     Platform used for Video Visit: GisellaMonstrous

## 2022-02-02 NOTE — TELEPHONE ENCOUNTER
Patient calling reporting she had scheduled appointment at long term COVID 19 clinic for today.  Stating she was contacted that she would need a referral prior to appointment at 4 pm today.    Symptoms starting 1/21/22 with headache, cough, fatigue.  Patient stating weakness and fatigue are the main symptoms she is concerned with. Stating she was looking to be seen in Long Term Covid Clinic for direction on what she should do.    Denies change or increase in symptoms.    Patient denies shortness of breath or chest pain.    Disposition to call telemedicine provider with in 24 hours.    Reviewed with patient to call back with any change or increase in symptoms.     Pearl Abreu RN  [facility]    COVID 19 Nurse Triage Plan/Patient Instructions    Please be aware that novel coronavirus (COVID-19) may be circulating in the community. If you develop symptoms such as fever, cough, or SOB or if you have concerns about the presence of another infection including coronavirus (COVID-19), please contact your health care provider or visit [email or web address].     Disposition/Instructions    Virtual Visit with provider recommended. Reference Visit Selection Guide.    Thank you for taking steps to prevent the spread of this virus.  o Limit your contact with others.  o Wear a simple mask to cover your cough.  o Wash your hands well and often.    Resources    [facility]: About COVID-19: [email or web address]    CDC: What to Do If You're Sick: www.cdc.gov/coronavirus/2019-ncov/about/steps-when-sick.html    CDC: Ending Home Isolation: www.cdc.gov/coronavirus/2019-ncov/hcp/disposition-in-home-patients.html     CDC: Caring for Someone: www.cdc.gov/coronavirus/2019-ncov/if-you-are-sick/care-for-someone.html     [facility]: Interim Guidance for Hospital Discharge to Home: www.health.[facility].[place].us/diseases/coronavirus/hcp/hospdischarge.pdf    [facility] clinical trials (COVID-19 research  studies): clinicalaffairs.[facility]-clinical-trials     Below are the COVID-19 hotlines at the [place] Department of Health ([facility]). Interpreters are available.   o For health questions: Call 521-321-2415 or 1-730.322.3263 (7 a.m. to 7 p.m.)  o For questions about schools and childcare: Call 973-327-6723 or 1-826.157.8706 (7 a.m. to 7 p.m.)                     .Reason for Disposition    [1] Caller has NON-URGENT question AND [2] triager unable to answer    Additional Information    Negative: SEVERE difficulty breathing (e.g., struggling for each breath, speaks in single words)    Negative: [1] SEVERE weakness (e.g., can't stand or can barely walk) AND [2] new-onset or WORSE    Negative: Difficult to awaken or acting confused (e.g., disoriented, slurred speech)    Negative: Bluish (or gray) lips or face now    Negative: Sounds like a life-threatening emergency to the triager    Negative: [1] Typical COVID-19 symptoms AND [2] lasting less than 3 weeks    Negative: [1] Chest pain, pressure, or tightness AND [2] new-onset or worsening    Negative: [1] Fever AND [2] new-onset or worsening    Negative: [1] MODERATE difficulty breathing (e.g., speaks in phrases, SOB even at rest, pulse 100-120) AND [2] new-onset or WORSE    Negative: [1] MODERATE difficulty breathing AND [2] oxygen level (e.g., pulse oximetry) 91 to 94 percent    Negative: Oxygen level (e.g., pulse oximetry) 90 percent or lower    Negative: MODERATE difficulty breathing (e.g., speaks in phrases, SOB even at rest, pulse 100-120)    Negative: [1] Drinking very little AND [2] dehydration suspected (e.g., no urine > 12 hours, very dry mouth, very lightheaded)    Negative: Patient sounds very sick or weak to the triager    Negative: [1] MILD difficulty breathing (e.g., minimal/no SOB at rest, SOB with walking, pulse <100) AND [2] new-onset    Negative: Oxygen level (e.g., pulse oximetry) 91 to 94 percent    Negative: [1] PERSISTING SYMPTOMS OF COVID-19 AND  [2] NEW symptom AND [3] could be serious    Negative: [1] Caller has URGENT question AND [2] triager unable to answer question    Negative: [1] PERSISTING SYMPTOMS OF COVID-19 AND [2] symptoms WORSE    Protocols used: CORONAVIRUS (COVID-19) PERSISTING SYMPTOMS FOLLOW-UP CALL-A- 8.25.2021

## 2022-02-02 NOTE — PATIENT INSTRUCTIONS
Pham De La Vega,    Thank you for allowing [facility] to manage your care.    I made a referral to the Post-COVID Clinic as needed. Please call the specialty number on your after visit summary to schedule.     I sent your prescription to your pharmacy.    Drink 8-10 glasses of fluid daily to stay well-hydrated.    For your pain, please use Ibuprofen 400mg four times daily with food. Between ibuprofen doses, you may use Tylenol 650mg.     Max acetaminophen (Tylenol) 4,000mg/24 hours  Max ibuprofen 3,200mg/24 hours    Please allow 1-2 business days for our office to contact you in regards to your laboratory/radiological studies.  If not done so, I encourage you to login into [employer] ([email or web address] to review your results as well.     If you have any questions or concerns, please feel free to call us at [phone]    Sincerely,    Den Cabrera PA-C    Did you know?      You can schedule a video visit for follow-up appointments as well as future appointments for certain conditions.  Please see the below link.     [email or web address]    If you have not already done so,  I encourage you to sign up for [employer] ([email or web address]).  This will allow you to review your results, securely communicate with a provider, and schedule virtual visits as well.      Patient Education   Discharge Instructions for COVID-19 Patients  You have--or may have--COVID-19. Please follow the instructions listed below.   If you have a weakened immune system, discuss with your doctor any other actions you need to take.  How can I protect others?  If you have symptoms (fever, cough, body aches or trouble breathing):    Stay home and away from others (self-isolate) until:  ? Your other symptoms have resolved (gotten better). And   ? You've had no fever--and no medicine that reduces fever--for 1 full day (24 hours). And   ? At least 10 days have passed since your  "symptoms started. (You may need to wait 20 days. Follow the advice of your care team.)  If you don't show symptoms, but testing showed that you have COVID-19:    Stay home and away from others (self-isolate) until at least 10 days have passed since the date of your first positive COVID-19 test.  During this time    Stay in your own room, even for meals. Use your own bathroom if you can.    Stay away from others in your home. No hugging, kissing or shaking hands. No visitors.    Don't go to work, school or anywhere else.    Clean \"high touch\" surfaces often (doorknobs, counters, handles). Use household cleaning spray or wipes.    You'll find a full list of  on the EPA website: www.epa.gov/pesticide-registration/list-n-disinfectants-use-against-sars-cov-2.    Cover your mouth and nose with a mask or other face covering to avoid spreading germs.    Wash your hands and face often. Use soap and water.    Caregivers in these groups are at risk for severe illness due to COVID-19:  ? People 65 years and older  ? People who live in a nursing home or long-term care facility  ? People with chronic disease (lung, heart, cancer, diabetes, kidney, liver, immunologic)  ? People who have a weakened immune system, including those who:    Are in cancer treatment    Take medicine that weakens the immune system, such as corticosteroids    Had a bone marrow or organ transplant    Have an immune deficiency    Have poorly controlled HIV or AIDS    Are obese (body mass index of 40 or higher)    Smoke regularly    Caregivers should wear gloves while washing dishes, handling laundry and cleaning bedrooms and bathrooms.    Use caution when washing and drying laundry: Don't shake dirty laundry and use the warmest water setting that you can.    For more tips on managing your health at home, go to www.cdc.gov/coronavirus/2019-ncov/downloads/10Things.pdf.  How can I take care of myself at home?  1. Get lots of rest. Drink extra fluids " (unless a doctor has told you not to).  2. Take Tylenol (acetaminophen) for fever or pain. If you have liver or kidney problems, ask your family doctor if it's okay to take Tylenol.   Adults can take either:   ? 650 mg (two 325 mg pills) every 4 to 6 hours, or   ? 1,000 mg (two 500 mg pills) every 8 hours as needed.  ? Note: Don't take more than 3,000 mg in one day. Acetaminophen is found in many medicines (both prescribed and over-the-counter medicines). Read all labels to be sure you don't take too much.   For children, check the Tylenol bottle for the right dose. The dose is based on the child's age or weight.  3. If you have other health problems (like cancer, heart failure, an organ transplant or severe kidney disease): Call your specialty clinic if you don't feel better in the next 2 days.  4. Know when to call 911. Emergency warning signs include:  ? Trouble breathing or shortness of breath  ? Pain or pressure in the chest that doesn't go away  ? Feeling confused like you haven't felt before, or not being able to wake up  ? Bluish-colored lips or face  5. Your doctor may have prescribed a blood thinner medicine. Follow their instructions.  Where can I get more information?    [facility] - About COVID-19:   [email or web address]    CDC - What to Do If You're Sick: www.cdc.gov/coronavirus/2019-ncov/about/steps-when-sick.html    CDC - Ending Home Isolation: www.cdc.gov/coronavirus/2019-ncov/hcp/disposition-in-home-patients.html    CDC - Caring for Someone: www.cdc.gov/coronavirus/2019-ncov/if-you-are-sick/care-for-someone.html    [facility] - Interim Guidance for Hospital Discharge to Home: www.health.[facility].[place]/diseases/coronavirus/hcp/hospdischarge.pdf    Below are the COVID-19 hotlines at the [facility] ([facility]). Interpreters are available.  ? For health questions: Call 932-945-9643 or 1-464.546.1157 (7 a.m. to 7 p.m.)  ? For questions about schools and childcare: Call  [phone] or 1-211.249.2439 (7 a.m. to 7 p.m.)    For informational purposes only. Not to replace the advice of your health care provider. Clinically reviewed by Dr. Juice Alonzo.   Copyright   2020 [facility]. All rights reserved. [employer] 198743 - REV 01/05/21.

## 2022-02-04 ENCOUNTER — TELEPHONE (OUTPATIENT)
Dept: FAMILY MEDICINE | Facility: CLINIC | Age: 47
End: 2022-02-04

## 2022-02-04 ENCOUNTER — MYC MEDICAL ADVICE (OUTPATIENT)
Dept: FAMILY MEDICINE | Facility: CLINIC | Age: 47
End: 2022-02-04
Payer: COMMERCIAL

## 2022-02-04 DIAGNOSIS — R05.9 COUGH: Primary | ICD-10-CM

## 2022-02-04 RX ORDER — DEXAMETHASONE 2 MG/1
10 TABLET ORAL EVERY OTHER DAY
Qty: 10 TABLET | Refills: 0 | Status: SHIPPED | OUTPATIENT
Start: 2022-02-04 | End: 2023-10-31

## 2022-02-04 NOTE — TELEPHONE ENCOUNTER
Routed to covering providers to please advise.  Gely RN,BSN  Triage Nurse  [facility]: [facility]  Ph: [phone]

## 2022-02-04 NOTE — TELEPHONE ENCOUNTER
Reason for Call:  Other call back and prescription    Detailed comments:  Patient saw Den on Wednesday 2-2-22 and discuss taking steroid and declined patient has changed her mind wants to start steroid we talked about @ appointment.  Any questions please patient and also wants asap.  Please send to Walter on [address] [phone].      Phone Number Patient can be reached at: Home number on file [phone] (home)    Best Time:  Asap    Can we leave a detailed message on this number? YES    Call taken on 2/4/2022 at 11:19 AM by Catalina Mckeon

## 2022-06-26 ENCOUNTER — HEALTH MAINTENANCE LETTER (OUTPATIENT)
Age: 47
End: 2022-06-26

## 2022-08-11 ENCOUNTER — HOSPITAL ENCOUNTER (EMERGENCY)
Facility: CLINIC | Age: 47
Discharge: LEFT BEFORE DISCHARGE | End: 2022-08-12
Payer: COMMERCIAL

## 2022-08-11 ASSESSMENT — ACTIVITIES OF DAILY LIVING (ADL): ADLS_ACUITY_SCORE: 33

## 2022-08-12 ENCOUNTER — NURSE TRIAGE (OUTPATIENT)
Dept: NURSING | Facility: CLINIC | Age: 47
End: 2022-08-12

## 2022-08-12 ENCOUNTER — TELEPHONE (OUTPATIENT)
Dept: NURSING | Facility: CLINIC | Age: 47
End: 2022-08-12

## 2022-08-12 NOTE — TELEPHONE ENCOUNTER
Nurse Triage SBAR    Serotonin reuptake syndrome- diagnosed by  in [facility]   Had to leave work last night, muscles got weak and heavy  127/95 bp   97 pulse    /75 in UC per patient report  States she pulled or tore her hamstring- was taking a cyclobenzaprine- 3 muscle relaxer- states she is uncertain if this had any effect on her other medications    States she has not done the recommended follow up  States she took her antidepressant last night and the night before   Called and spoke to another nurse today- state she spoke to a pharmacist, states she is uncertain if she understood the pharmacist correctly  States she doesn't have a primary     Patient had called earlier and spoken to a triage nurse- was advised to be seen.     Writer reviewed the recommendations with patient- she agrees to be seen, no other questions. States she was trying to get a same day appointment. Jalil was not able to find any prior to transferring to triage. Writer advised that patient be see in - patient state she would like to go to the [facility] - writer advised that it not [facility] but she can go where she would like to for care.     Nataly Montgomery RN BSN 8/12/2022 11:29 AM      Reason for Disposition    Caller has already spoken with another triager or PCP (or office), and has further questions and triager able to answer questions.    Additional Information    Negative: Caller has already spoken with the PCP (or office), and has no further questions    Negative: Caller has already spoken with another triager and has no further questions    Protocols used: NO CONTACT OR DUPLICATE CONTACT CALL-A-OH

## 2022-08-12 NOTE — TELEPHONE ENCOUNTER
Triage call  Patient was seen in the urgent care at [facility]  And diagnosed with serotonin syndrome.  She had taken some flexeril for some leg pain she had after yoga and she was shaking, confused  Her blood pressure and pulse were fast.  She was directed if she does not feel any better that she should have a EKG done.  She went to the ED last night but she not stay because  It was a 5 hour wait. She is wondering about her lexapro and if she should take it.  Recommended  that she talk to the Pharmacist and she either go to the urgent care or get a appointment with her primary doctor.  She agreed and will follow up.    Clarissa Reynolds RN   [facility] Nurse Advisor  10:10 AM 8/12/2022    COVID 19 Nurse Triage Plan/Patient Instructions    Please be aware that novel coronavirus (COVID-19) may be circulating in the community. If you develop symptoms such as fever, cough, or SOB or if you have concerns about the presence of another infection including coronavirus (COVID-19), please contact your health care provider or visit [email or web address].     Disposition/Instructions    In-Person Visit with provider recommended. Reference Visit Selection Guide.    Thank you for taking steps to prevent the spread of this virus.  o Limit your contact with others.  o Wear a simple mask to cover your cough.  o Wash your hands well and often.    Resources    [facility]: About COVID-19: [email or web address]    CDC: What to Do If You're Sick: www.cdc.gov/coronavirus/2019-ncov/about/steps-when-sick.html    CDC: Ending Home Isolation: www.cdc.gov/coronavirus/2019-ncov/hcp/disposition-in-home-patients.html     CDC: Caring for Someone: www.cdc.gov/coronavirus/2019-ncov/if-you-are-sick/care-for-someone.html     [facility]: Interim Guidance for Hospital Discharge to Home: www.health.[facility].mn.us/diseases/coronavirus/hcp/hospdischarge.pdf    [facility] clinical trials (COVID-19 research studies):  clinicalaffairs.[facility]-clinical-trials     Below are the COVID-19 hotlines at the [place] [facility] ([facility]). Interpreters are available.   o For health questions: Call 516-743-2970 or 1-181.351.4660 (7 a.m. to 7 p.m.)  o For questions about schools and childcare: Call 091-907-1727 or 1-131.995.1420 (7 a.m. to 7 p.m.)

## 2022-08-16 ENCOUNTER — OFFICE VISIT (OUTPATIENT)
Dept: ORTHOPEDICS | Facility: CLINIC | Age: 47
End: 2022-08-16
Payer: COMMERCIAL

## 2022-08-16 DIAGNOSIS — M25.559 HIP PAIN: Primary | ICD-10-CM

## 2022-08-16 PROCEDURE — 99203 OFFICE O/P NEW LOW 30 MIN: CPT | Performed by: FAMILY MEDICINE

## 2022-08-16 NOTE — PROGRESS NOTES
CHIEF COMPLAINT:  Pain of the Left Hip       HISTORY OF PRESENT ILLNESS  Ms. Hayward is a pleasant 47 year old year old female who presents to clinic today with left hip pain.  Yolette explains that on 8/5/22 she was doing Yoga, but with quick movements. She leaned down to stretch towards her left foot and felt a pull and pain over the proximal left hamstring. She took a muscle relaxant, which she ended up having a reaction to this medication so discontinued. She went and saw an acupuncturist, tried ice and ibuprofen. Pain is worse with prolonged walking and standing. Pain is localized over the buttocks area/proximal hamstring, but now is also feeling pain over the groin and adductor area.     Onset: sudden  Location: left hip  Quality:  aching  Duration: 2 weeks   Severity: 3/10 at worst  Timing:constant  Modifying factors:  resting and non-use makes it better, movement and use makes it worse  Associated signs & symptoms: pain  Previous similar pain: No  Treatments to date:acupuncture, ice, ibuprofen    Additional history: as documented    Review of Systems:    Have you recently had a a fever, chills, weight loss? No    Do you have any vision problems? No    Do you have any chest pain or edema? No    Do you have any shortness of breath or wheezing?  No    Do you have stomach problems? No    Do you have any numbness or focal weakness? No    Do you have diabetes? No    Do you have problems with bleeding or clotting? No    Do you have an rashes or other skin lesions? No    MEDICAL HISTORY  There is no problem list on file for this patient.      Current Outpatient Medications   Medication Sig Dispense Refill     albuterol (PROAIR HFA/PROVENTIL HFA/VENTOLIN HFA) 108 (90 Base) MCG/ACT inhaler Inhale 1-2 puffs into the lungs every 4 hours as needed for shortness of breath / dyspnea or wheezing 6.7 g 0     B Complex-Biotin-FA (B-COMPLEX PO)        dexamethasone (DECADRON) 2 MG tablet Take 5 tablets (10 mg) by mouth every other  day for 2 doses 10 tablet 0     escitalopram (LEXAPRO) 10 MG tablet 20 mg        Omega-3 Fatty Acids (FISH OIL PO)          Allergies   Allergen Reactions     Wellbutrin [Bupropion]        No family history on file.    Additional medical/Social/Surgical histories reviewed in [facility] and updated as appropriate.       PHYSICAL EXAM  There were no vitals taken for this visit.    General  - normal appearance, in no obvious distress  Musculoskeletal - left hip  - stance: normal gait without limp, normal single leg squat  - inspection: no swelling or effusion,  normal bone and joint alignment, no obvious deformity  - palpation: no lateral or anterior hip tenderness Tenderness at myotendinous junction and muscle belly of hamstrings at proximal 1/3 of thigh.  - ROM: normal flexion, extension of hip. Knee flexion and extension without pain actively or passively.  Hamstring pain and tightness with hip at 90/ supine.  - strength: 5/5 in all planes except 4-/5 hamstring activation/ knee flexion against resistance.  - special tests:  (-) OR[place]O  (-) FADIR  no pain with axial femoral load  Neuro  - no sensory or motor deficit, grossly normal coordination, normal muscle tone    IMAGING : XR AP pelvis. Final results and radiologist's interpretation, available in the [facility] health record. Images were reviewed with the patient/family members in the office today. My personal interpretation of the performed imaging is no ischial tuberosity abnormality. Normal hips.     ASSESSMENT & PLAN  Ms. Hayward is a 47 year old year old female who presents to clinic today with strain of right hamstring.    Diagnosis: Hamstring strain of left lower extremity    - Start HEP for hamstring, gentle stretching and activation  -Hold off on painful activities especially deep stretches in Yoga  -Ice/heat  -NSAIDs reviewed and may try 10 day course  -Okay for acupuncture or therapeutic massage.  -Follow up in 4 weeks if persisting, consider formal PT.    It was a  pleasure seeing Yolette today.    Dereje Glass DO, CAQSM  Primary Care Sports Medicine

## 2022-08-16 NOTE — PATIENT INSTRUCTIONS
Hamstring Strain Instructions    HAMSTRING INJURY:  Pain in buttock (proximal tendon), back of thigh (muscle body) or behind knee (distal tendon) usually after acute injury but may be chronic  Increased in runners and jumpers  May have swelling and bruising (usually more so when tear muscle)  Acute pain is due to a tearing of the muscle body or the muscle tendon junction which often leads to some bleeding (bruising)  Pain may become chronic after acute injury if not treated or from repetitive minor trauma (overuse)    TREATMENT:  Relative rest - Once pain free at rest, you may increase activity level as tolerated  Cross-train with pool running, biking, elliptical  Warm up with heat or gentle exercise (planks or pushups or easy biking or walking) prior to exercise/activity  Ice 10-15 minutes several times daily the first few days then after activity.  Gentle stretching of leg muscles when area is  to touch  Increase stretching exercises when non-tender and start strengthening exercises as instructed or per physical therapy instruction  Usually takes 3-4 weeks after acute injury before pain free but longer before return of strength and pain free activity  Takes longer if try to run/exercise through the pain--choose pain free alternatives (try weight lifting, pool jogging, elliptical or light biking)  Stretching and strengthening therapy  Ice 10-15 minutes after activity. (Ice cups for massage 5-7min)  Ice and NSAIDs help decrease inflammation.   Often component of hip weakness that leads to lower extremity (foot, ankle, shin, and knee) problems so a lot of focus will be on core strength and balance  Recommend yoga for core strengthening and stretching  Perform exercises as instructed through handout or formal therapy if doing. Until then start with the following:  Ankle strengthening  1) balance on one foot 1-2 min daily  2) once able to balance for 1 minute, start hip strengthening with 4 way motion with  straight leg. tie theraband around ankle and balance on other foot while doing 15 reps each direction of straight leg motion  3) ankle exercises (4 way with theraband)- 3 sets of 10-15 (fatigue) daily  Usually takes several weeks before pain free but longer before return to full activity without pain  Once released to increase activity, BE PATIENT!    Return to activity guidelines include:  If it hurts, don't do it  Start gradually and build up, wait 24 hours before increase intensity and time  Follow-up if not improving or if further concern after starting activity modification, strengthening, and other treatment modalities such as massage or active release therapy  If problem flares again after resolved, restart icing, and exercises.  If no improvement or pain continues to return with activity, other treatment options include:  Formal physical therapy if not started  Return if not improving with above treatment after 6 weeks.    Stretching / Strengthening  You may start the first 4 exercises right away. Make sure you do not feel any sharp pain. You should feel only a mild discomfort in the back of your thigh when you are doing these exercises.    Standing hamstring stretch: Put the heel of the leg on your injured side on a stool about 15 inches high. Keep your leg straight. Lean forward, bending at the hips, until you feel a mild stretch in the back of your thigh. Make sure you don't roll your shoulders or bend at the waist when doing this or you will stretch your lower back instead of your leg. Hold the stretch for 15 to 30 seconds. Repeat 3 times.    Hamstring stretch on wall: Lie on your back with your buttocks close to a doorway. Stretch your uninjured leg straight out in front of you on the floor through the doorway. Raise your injured leg and rest it against the wall next to the door frame. Keep your leg as straight as possible. You should feel a stretch in the back of your thigh. Hold this position for 15  to 30 seconds. Repeat 3 times.    Slump stretch: Sit slouched in a chair with your head bent down. Straighten your injured leg and move your foot toward you. Hold this position for 30 seconds. Relax and then repeat 2 times.    Prone knee bend: Lie on your stomach with your legs straight out behind you. Bend the knee on your injured side so that your heel comes toward your buttocks. Hold 5 seconds. Relax and return your foot to the floor. Do 2 sets of 15. As this gets easier you can add weights to your ankle.  When the pain is gone, start strengthening your hamstrings using the following exercises.    Prone hip extension: Lie on your stomach with your legs straight out behind you. Fold your arms under your head and rest your head on your arms. Draw your belly button in towards your spine and tighten your abdominal muscles. Tighten the buttocks and thigh muscles of the leg on your injured side and lift the leg off the floor about 8 inches. Keep your leg straight. Hold for 5 seconds. Then lower your leg and relax. Do 2 sets of 15.    Resisted hamstring curl: Place a chair facing a door about 3 feet from the door. Loop and tie one end of the tubing around the ankle of your injured leg. Tie a knot in the other end of the tubing and shut the knot in the door. Sit in the chair and raise your injured leg. Then bend your knee, bringing your foot down to the floor. Allow your foot to slide along the floor and move back underneath the chair, stretching the tubing. Slowly let your foot slide forward again. Do 2 sets of 15.  You can challenge yourself by moving the chair farther from the door to increase the resistance of the tubing.    Chair lift: Lie on your back with your heels resting on the top of a chair. Slowly raise both hips off the floor. Hold for 2 seconds and lower slowly. Do 3 sets of 15.  After your hamstrings have become stronger and you feel your leg is stable, you can begin strengthening the quadriceps (the  muscles in the front of the thigh) by doing lunges.    Lunge: Stand and take a large step forward with your right leg. Dip your left knee down toward the floor and bend your right leg. Return to the starting position. Repeat the exercise stepping forward with the left leg and dipping your right leg down toward the floor. Do 2 sets of 8 to 12 on each side. When this gets easy, you can do this exercise with small weights in your hands.

## 2022-08-16 NOTE — LETTER
8/16/2022      RE: Yolette Hayward  [address]  [facility] [address]     Dear Colleague,    Thank you for referring your patient, Yolette Hayward, to the [facility] [place]. Please see a copy of my visit note below.    CHIEF COMPLAINT:  Pain of the Left Hip       HISTORY OF PRESENT ILLNESS  Ms. Hayward is a pleasant 47 year old year old female who presents to clinic today with left hip pain.  Yolette explains that on 8/5/22 she was doing Yoga, but with quick movements. She leaned down to stretch towards her left foot and felt a pull and pain over the proximal left hamstring. She took a muscle relaxant, which she ended up having a reaction to this medication so discontinued. She went and saw an acupuncturist, tried ice and ibuprofen. Pain is worse with prolonged walking and standing. Pain is localized over the buttocks area/proximal hamstring, but now is also feeling pain over the groin and adductor area.     Onset: sudden  Location: left hip  Quality:  aching  Duration: 2 weeks   Severity: 3/10 at worst  Timing:constant  Modifying factors:  resting and non-use makes it better, movement and use makes it worse  Associated signs & symptoms: pain  Previous similar pain: No  Treatments to date:acupuncture, ice, ibuprofen    Additional history: as documented    Review of Systems:    Have you recently had a a fever, chills, weight loss? No    Do you have any vision problems? No    Do you have any chest pain or edema? No    Do you have any shortness of breath or wheezing?  No    Do you have stomach problems? No    Do you have any numbness or focal weakness? No    Do you have diabetes? No    Do you have problems with bleeding or clotting? No    Do you have an rashes or other skin lesions? No    MEDICAL HISTORY  There is no problem list on file for this patient.      Current Outpatient Medications   Medication Sig Dispense Refill     albuterol (PROAIR HFA/PROVENTIL HFA/VENTOLIN HFA) 108  (90 Base) MCG/ACT inhaler Inhale 1-2 puffs into the lungs every 4 hours as needed for shortness of breath / dyspnea or wheezing 6.7 g 0     B Complex-Biotin-FA (B-COMPLEX PO)        dexamethasone (DECADRON) 2 MG tablet Take 5 tablets (10 mg) by mouth every other day for 2 doses 10 tablet 0     escitalopram (LEXAPRO) 10 MG tablet 20 mg        Omega-3 Fatty Acids (FISH OIL PO)          Allergies   Allergen Reactions     Wellbutrin [Bupropion]        No family history on file.    Additional medical/Social/Surgical histories reviewed in [facility] and updated as appropriate.       PHYSICAL EXAM  There were no vitals taken for this visit.    General  - normal appearance, in no obvious distress  Musculoskeletal - left hip  - stance: normal gait without limp, normal single leg squat  - inspection: no swelling or effusion,  normal bone and joint alignment, no obvious deformity  - palpation: no lateral or anterior hip tenderness Tenderness at myotendinous junction and muscle belly of hamstrings at proximal 1/3 of thigh.  - ROM: normal flexion, extension of hip. Knee flexion and extension without pain actively or passively.  Hamstring pain and tightness with hip at 90/ supine.  - strength: 5/5 in all planes except 4-/5 hamstring activation/ knee flexion against resistance.  - special tests:  (-) ORLANDO  (-) FADIR  no pain with axial femoral load  Neuro  - no sensory or motor deficit, grossly normal coordination, normal muscle tone    IMAGING : XR AP pelvis. Final results and radiologist's interpretation, available in the [facility] health record. Images were reviewed with the patient/family members in the office today. My personal interpretation of the performed imaging is no ischial tuberosity abnormality. Normal hips.     ASSESSMENT & PLAN  Ms. Hayward is a 47 year old year old female who presents to clinic today with strain of right hamstring.    Diagnosis: Hamstring strain of left lower extremity    - Start HEP for hamstring, gentle  stretching and activation  -Hold off on painful activities especially deep stretches in Yoga  -Ice/heat  -NSAIDs reviewed and may try 10 day course  -Okay for acupuncture or therapeutic massage.  -Follow up in 4 weeks if persisting, consider formal PT.    It was a pleasure seeing Yolette today.    Dereje Glass DO, [facility]  Primary Care Sports Medicine        Again, thank you for allowing me to participate in the care of your patient.      Sincerely,    Dereje Glass DO

## 2022-08-16 NOTE — LETTER
Date:August 22, 2022      Patient was self referred, no letter generated. Do not send.        [facility] Health Information

## 2022-09-23 ENCOUNTER — HOSPITAL ENCOUNTER (OUTPATIENT)
Dept: PHYSICAL THERAPY | Facility: REHABILITATION | Age: 47
Discharge: HOME | End: 2022-09-23
Payer: COMMERCIAL

## 2022-09-23 DIAGNOSIS — S76.312A STRAIN OF LEFT HAMSTRING MUSCLE, INITIAL ENCOUNTER: Primary | ICD-10-CM

## 2022-09-23 PROCEDURE — 97110 THERAPEUTIC EXERCISES: CPT | Mod: GP | Performed by: PHYSICAL THERAPIST

## 2022-09-23 PROCEDURE — 97140 MANUAL THERAPY 1/> REGIONS: CPT | Mod: GP | Performed by: PHYSICAL THERAPIST

## 2022-09-23 PROCEDURE — 97161 PT EVAL LOW COMPLEX 20 MIN: CPT | Mod: GP | Performed by: PHYSICAL THERAPIST

## 2022-09-23 NOTE — PROGRESS NOTES
09/23/22 1200   General Information   Type of Visit Initial OP Ortho PT Evaluation   Start of Care Date 09/23/22   Referring Physician none   Patient/Family Goals Statement heal HS injury, be free from pain with walking   Certification Required? No   Surgical/Medical history reviewed Yes   Body Part(s)   Body Part(s) Lumbar Spine/SI   Presentation and Etiology   Pertinent history of current problem (include personal factors and/or comorbidities that impact the POC) Saw an orthopedic MD.  Pain in L proximal HS post-yoga.   Impairments A. Pain;D. Decreased ROM;E. Decreased flexibility;F. Decreased strength and endurance   Functional Limitations perform activities of daily living;perform required work activities;perform desired leisure / sports activities   Symptom Location Ishial tuberosity on left. Low back pain - midline.  Tightness in lateral HS   How/Where did it occur   (Pt was doing yoga in a straddle position - quick movement reaching towards L foot - heard a sound and started feeling pain in proximal HS attachement.)   Onset date of current episode/exacerbation 08/05/22   Chronicity New   Pain rating (0-10 point scale) Best (/10);Worst (/10)   Best (/10) 1-2   Worst (/10) 8   Pain quality   (dull and deep (denies numbness or tingling))   Frequency of pain/symptoms C. With activity   Pain/symptoms exacerbated by   (sitting in a hard chair, walking, sitting on toilet at work)   Pain/symptoms eased by   (chiropractor, acupunture)   Progression of symptoms since onset: Improved   Current / Previous Interventions   Diagnostic Tests: X-ray   X-ray Results unremarkable   Current Level of Function   Patient role/employment history A. Employed   Employment Comments Lab Tech   Fall Risk Screen   Fall screen completed by PT   Have you fallen 2 or more times in the past year? No   Have you fallen and had an injury in the past year? No   Is patient a fall risk? No   Abuse Screen (yes response referral indicated)   Feels  Unsafe at Home or Work/School no   Feels Threatened by Someone no   Does Anyone Try to Keep You From Having Contact with Others or Doing Things Outside Your Home? no   Physical Signs of Abuse Present no   Patient needs abuse support services and resources No   System Outcome Measures   Outcome Measures   (LEFS)   Lumbar Spine/SI Objective Findings   Balance/Proprioception (Single Leg Stance) WFL 1/10 pain   Hamstring Flexibility 90-95 on R / 50-60 on L   Flexion ROM 2-3' fingertip to floor - stiff/pain - normally hand to the ground   Extension ROM WFL   Right Side Bending ROM WFL   Left Side Bending ROM WFL   Hip Flexion (L2) Strength R 4, L 4-   Knee Flexion Strength 4+ pain on L   Knee Extension (L3) Strength 4+   Ankle Dorsiflexion (L4) Strength 5   Lumbar/SI Special Tests Comments no pain with SKTC, mild pain in low back with pevic tilts (APT)   Palpation no pain in ischial tuberosity on left, distal to ischial tub - tight and painful and distal/medial   Planned Therapy Interventions   Planned Therapy Interventions joint mobilization;manual therapy;neuromuscular re-education;ROM;strengthening;stretching   Clinical Impression   Criteria for Skilled Therapeutic Interventions Met yes, treatment indicated   PT Diagnosis hamstring tightness, low back pain   Influenced by the following impairments pain with palpation to hamstrings - distal to ischial tub.  pain in QL   Functional limitations due to impairments walking, prolonged standing, yoga, running   Clinical Presentation Stable/Uncomplicated   Clinical Presentation Rationale unchanging   Clinical Decision Making (Complexity) Low complexity   Therapy Frequency 1 time/week   Predicted Duration of Therapy Intervention (days/wks) 4-6 session over 10 weeks   Risk & Benefits of therapy have been explained Yes   Patient, Family & other staff in agreement with plan of care Yes   Education Assessment   Barriers to Learning No barriers   ORTHO GOALS   PT Ortho Eval Goals  1;2;3;4   Ortho Goal 1   Goal Identifier HEP   Goal Description Pt is independent with HEP to manage symptoms   Target Date 10/21/22   Ortho Goal 2   Goal Identifier Work   Goal Description Pt will work for 8 hours with prolonged standing/walking without pain >2/10 in L HS   Target Date 11/18/22   Ortho Goal 3   Goal Identifier Yoga   Goal Description Pt will return to yoga without need for modifications without increased pain in L HS   Target Date 12/02/22   Total Evaluation Time   PT Eval, Low Complexity Minutes (86438) 30

## 2022-09-28 ENCOUNTER — HOSPITAL ENCOUNTER (OUTPATIENT)
Dept: PHYSICAL THERAPY | Facility: REHABILITATION | Age: 47
Discharge: HOME | End: 2022-09-28
Payer: COMMERCIAL

## 2022-09-28 DIAGNOSIS — S76.312A STRAIN OF LEFT HAMSTRING MUSCLE, INITIAL ENCOUNTER: Primary | ICD-10-CM

## 2022-09-28 DIAGNOSIS — G44.209 TENSION HEADACHE: ICD-10-CM

## 2022-09-28 DIAGNOSIS — M54.2 NECK PAIN ON LEFT SIDE: ICD-10-CM

## 2022-09-28 DIAGNOSIS — M54.9 UPPER BACK PAIN ON LEFT SIDE: ICD-10-CM

## 2022-09-28 PROCEDURE — 97140 MANUAL THERAPY 1/> REGIONS: CPT | Mod: GP | Performed by: PHYSICAL THERAPIST

## 2022-10-12 ENCOUNTER — HOSPITAL ENCOUNTER (OUTPATIENT)
Dept: PHYSICAL THERAPY | Facility: REHABILITATION | Age: 47
Discharge: HOME | End: 2022-10-12
Payer: COMMERCIAL

## 2022-10-12 DIAGNOSIS — S76.312A STRAIN OF LEFT HAMSTRING MUSCLE, INITIAL ENCOUNTER: Primary | ICD-10-CM

## 2022-10-12 PROCEDURE — 97110 THERAPEUTIC EXERCISES: CPT | Mod: GP | Performed by: PHYSICAL THERAPIST

## 2022-10-12 PROCEDURE — 97140 MANUAL THERAPY 1/> REGIONS: CPT | Mod: GP | Performed by: PHYSICAL THERAPIST

## 2022-10-12 NOTE — ADDENDUM NOTE
Encounter addended by: Marifer Garcia, PT on: 10/12/2022 12:38 PM   Actions taken: Charge Capture section accepted

## 2022-10-19 ENCOUNTER — HOSPITAL ENCOUNTER (OUTPATIENT)
Dept: PHYSICAL THERAPY | Facility: REHABILITATION | Age: 47
Discharge: HOME | End: 2022-10-19
Payer: COMMERCIAL

## 2022-10-19 DIAGNOSIS — S76.312A STRAIN OF LEFT HAMSTRING MUSCLE, INITIAL ENCOUNTER: Primary | ICD-10-CM

## 2022-10-19 PROCEDURE — 97140 MANUAL THERAPY 1/> REGIONS: CPT | Mod: GP | Performed by: PHYSICAL THERAPIST

## 2022-11-07 ENCOUNTER — LAB REQUISITION (OUTPATIENT)
Dept: LAB | Facility: CLINIC | Age: 47
End: 2022-11-07

## 2022-11-07 PROCEDURE — 86481 TB AG RESPONSE T-CELL SUSP: CPT | Performed by: INTERNAL MEDICINE

## 2022-11-07 PROCEDURE — 36415 COLL VENOUS BLD VENIPUNCTURE: CPT | Performed by: INTERNAL MEDICINE

## 2022-11-09 LAB
GAMMA INTERFERON BACKGROUND BLD IA-ACNC: 0.05 IU/ML
M TB IFN-G BLD-IMP: NEGATIVE
M TB IFN-G CD4+ BCKGRND COR BLD-ACNC: 9.95 IU/ML
MITOGEN IGNF BCKGRD COR BLD-ACNC: -0.01 IU/ML
MITOGEN IGNF BCKGRD COR BLD-ACNC: -0.01 IU/ML
QUANTIFERON MITOGEN: 10 IU/ML
QUANTIFERON NIL TUBE: 0.05 IU/ML
QUANTIFERON TB1 TUBE: 0.04 IU/ML
QUANTIFERON TB2 TUBE: 0.04

## 2022-11-11 ENCOUNTER — HOSPITAL ENCOUNTER (OUTPATIENT)
Dept: PHYSICAL THERAPY | Facility: REHABILITATION | Age: 47
Discharge: HOME | End: 2022-11-11
Payer: COMMERCIAL

## 2022-11-11 DIAGNOSIS — S76.312A STRAIN OF LEFT HAMSTRING MUSCLE, INITIAL ENCOUNTER: Primary | ICD-10-CM

## 2022-11-11 PROCEDURE — 97140 MANUAL THERAPY 1/> REGIONS: CPT | Mod: GP | Performed by: PHYSICAL THERAPIST

## 2022-11-11 PROCEDURE — 97110 THERAPEUTIC EXERCISES: CPT | Mod: GP | Performed by: PHYSICAL THERAPIST

## 2022-11-21 ENCOUNTER — HOSPITAL ENCOUNTER (OUTPATIENT)
Dept: PHYSICAL THERAPY | Facility: REHABILITATION | Age: 47
Discharge: HOME | End: 2022-11-21
Payer: COMMERCIAL

## 2022-11-21 DIAGNOSIS — S76.312A STRAIN OF LEFT HAMSTRING MUSCLE, INITIAL ENCOUNTER: Primary | ICD-10-CM

## 2022-11-21 PROCEDURE — 97140 MANUAL THERAPY 1/> REGIONS: CPT | Mod: GP | Performed by: PHYSICAL THERAPIST

## 2022-12-26 ENCOUNTER — HOSPITAL ENCOUNTER (OUTPATIENT)
Dept: PHYSICAL THERAPY | Facility: REHABILITATION | Age: 47
Discharge: HOME | End: 2022-12-26
Payer: COMMERCIAL

## 2022-12-26 DIAGNOSIS — G44.209 TENSION HEADACHE: ICD-10-CM

## 2022-12-26 DIAGNOSIS — M54.2 NECK PAIN ON LEFT SIDE: ICD-10-CM

## 2022-12-26 DIAGNOSIS — M54.9 UPPER BACK PAIN ON LEFT SIDE: ICD-10-CM

## 2022-12-26 DIAGNOSIS — S76.312A STRAIN OF LEFT HAMSTRING MUSCLE, INITIAL ENCOUNTER: Primary | ICD-10-CM

## 2022-12-26 PROCEDURE — 97110 THERAPEUTIC EXERCISES: CPT | Mod: GP | Performed by: PHYSICAL THERAPIST

## 2022-12-26 PROCEDURE — 97140 MANUAL THERAPY 1/> REGIONS: CPT | Mod: GP | Performed by: PHYSICAL THERAPIST

## 2022-12-26 NOTE — ADDENDUM NOTE
Encounter addended by: Marifer Garcia, PT on: 12/26/2022 12:13 PM   Actions taken: Pend clinical note, Flowsheet accepted, Clinical Note Signed

## 2022-12-26 NOTE — PROGRESS NOTES
Beginning/End Dates of Progress Note Reporting Period:  9/23/22 to 12/22/22    Progress Toward Goals:   Progress limited due to none compliance with HEP     Client Self (Subjective) Report for Progress Note Reporting Period: Still feeling the hamstring.  Always feels it.  Has not been compliant with HEP.    Outcome Measures (Most Recent Score):    LEF not completed     Objective Measurements:   Objective Measure: HS on left: starts to feel stretch/discomfort at 45 degrees - stretch to 80 degrees     Objective Measure: Palpation: tenderness/tightness distal lateral HS     Objective Measure: mildly positive UE nerve glides  Details: ulnar, radial and median                           12/26/22 0711   Signing Clinician's Name / Credentials   Signing clinician's name / credentials Marifer Garcia, PT, DPT   Session Number   Session Number 7   Progress Note/Recertification   Progress Note Due Date 03/22/22   Progress Note Completed Date 12/22/22   Adult Goals   PT Ortho Eval Goals 1;2;3;4   Ortho Goal 1   Goal Identifier HEP   Goal Description Pt is independent with HEP to manage symptoms   Goal Progress has not been performing her ex's consistenly - reviewed today   Target Date 02/06/23   Ortho Goal 2   Goal Identifier Work   Goal Description Pt will work for 8 hours with prolonged standing/walking without pain >2/10 in L HS   Goal Progress MET   Target Date 11/18/22   Date Met 11/11/22   Ortho Goal 3   Goal Identifier Yoga   Goal Description Pt will return to yoga without need for modifications without increased pain in L HS   Goal Progress has been performing but can feel hamstring discomfort   Target Date 02/06/23   Subjective Report   Subjective Report Still feeling the hamstring.  Always feels it.  Has not been compliant with HEP.   Objective Measures   Objective Measures Objective Measure 1;Objective Measure 2;Objective Measure 3   Objective Measure 1   Objective Measure HS on left: starts to feel  stretch/discomfort at 45 degrees - stretch to 80 degrees   Objective Measure 2   Objective Measure Palpation: tenderness/tightness distal lateral HS   Objective Measure 3   Objective Measure mildly positive UE nerve glides   Details ulnar, radial and median   Treatment Interventions   Interventions Therapeutic Procedure/Exercise;Manual Therapy   Therapeutic Procedure/exercise   Therapeutic Procedures: strength, endurance, ROM, flexibillity minutes (25676) 38   Skilled Intervention HEP   Patient Response understanding - recommended strengthening 2-3x a week.  Stretching daily, nerve glides daily.   Treatment Detail reviewed SL dead lift x8 trialed on L, HS curl seated L1 band x10 on L - discussed and recommended seated HS curl or prone HS curl at the gym - limit to light weight - start with 1-2 sets 10 reps, discussed prone leg extension, discussed bridge.   Progress added median nerve tensioners, ulnar nerve:butterfly, ulnar nerve glides, median nerve rocking - 10 reps each (chose 2 to perform at a time), added wrist flexion, extension x10 reps 1 set with 3#, wrist flexor and extensor stretch 20 seconds.   Manual Therapy   Manual Therapy: Mobilization, MFR, MLD, friction massage minutes (43696) 10   Skilled Intervention Soft Tissue mobilization   Treatment Detail STM: L lateral HS in supine.  HS stretch on L 60 seconds.   Patient Response tenderness in L lateral lower HS   Education   Learner Patient   Readiness Acceptance   Method Booklet/handout;Explanation;Demonstration   Plan   Home program HS stretch, HS eccentric lower, bridge on heels, SL dead lift, HS curl in sitting, superman   Plan for next session review HEP as needed - manual therapy   Comments   Comments Significant improvement in left HS pain - continuing to address functional limitations such as running, walking >1 mile.  Pt reports hx of carpal tunnel that has flared up.  Gave pt a few exercises to perform.  Can perform a carpal tunnel evaluation in  the future - as needed.   Total Session Time   Timed Code Treatment Minutes 48 - pt 10 min late   Total Treatment Time (sum of timed and untimed services) 48   Medicare Claim Information   PT Diagnosis hamstring tightness, low back pain   Start of Care Date 09/23/22   Onset date of current episode/exacerbation 08/05/22

## 2023-02-15 ENCOUNTER — LAB REQUISITION (OUTPATIENT)
Dept: LAB | Facility: CLINIC | Age: 48
End: 2023-02-15

## 2023-02-15 ENCOUNTER — HOSPITAL ENCOUNTER (OUTPATIENT)
Dept: RESEARCH | Facility: CLINIC | Age: 48
Discharge: HOME | End: 2023-02-15

## 2023-02-15 LAB
CHOLEST SERPL-MCNC: 162 MG/DL
CRP SERPL HS-MCNC: 2.08 MG/L
ERYTHROCYTE [DISTWIDTH] IN BLOOD BY AUTOMATED COUNT: 12.9 % (ref 10–15)
FASTING STATUS PATIENT QL REPORTED: NORMAL
GLUCOSE SERPL-MCNC: 83 MG/DL (ref 70–99)
HCT VFR BLD AUTO: 40.3 % (ref 35–47)
HDLC SERPL-MCNC: 69 MG/DL
HGB BLD-MCNC: 12.7 G/DL (ref 11.7–15.7)
HOLD SPECIMEN: NORMAL
INSULIN SERPL-ACNC: 4.3 UU/ML (ref 2.6–24.9)
LDLC SERPL CALC-MCNC: 86 MG/DL
MCH RBC QN AUTO: 29.5 PG (ref 26.5–33)
MCHC RBC AUTO-ENTMCNC: 31.5 G/DL (ref 31.5–36.5)
MCV RBC AUTO: 94 FL (ref 78–100)
NONHDLC SERPL-MCNC: 93 MG/DL
PLATELET # BLD AUTO: 290 10E3/UL (ref 150–450)
RBC # BLD AUTO: 4.31 10E6/UL (ref 3.8–5.2)
T4 FREE SERPL-MCNC: 1.2 NG/DL (ref 0.9–1.7)
TRIGL SERPL-MCNC: 37 MG/DL
TSH SERPL DL<=0.005 MIU/L-ACNC: 1.36 UIU/ML (ref 0.3–4.2)
WBC # BLD AUTO: 4 10E3/UL (ref 4–11)

## 2023-02-15 PROCEDURE — 510N000009 HC RESEARCH FACILITY, PER 15 MIN

## 2023-02-15 PROCEDURE — 80061 LIPID PANEL: CPT | Performed by: INTERNAL MEDICINE

## 2023-02-15 PROCEDURE — 300N000004 HC RESEARCH SPECIMEN PROCESSING, MODERATE

## 2023-02-15 PROCEDURE — 83525 ASSAY OF INSULIN: CPT | Performed by: INTERNAL MEDICINE

## 2023-02-15 PROCEDURE — 84439 ASSAY OF FREE THYROXINE: CPT | Performed by: INTERNAL MEDICINE

## 2023-02-15 PROCEDURE — 84443 ASSAY THYROID STIM HORMONE: CPT | Performed by: INTERNAL MEDICINE

## 2023-02-15 PROCEDURE — 86141 C-REACTIVE PROTEIN HS: CPT | Performed by: INTERNAL MEDICINE

## 2023-02-15 PROCEDURE — 510N000017 HC CRU PATIENT CARE, PER 15 MIN

## 2023-02-15 PROCEDURE — 85014 HEMATOCRIT: CPT | Performed by: INTERNAL MEDICINE

## 2023-02-15 PROCEDURE — 83036 HEMOGLOBIN GLYCOSYLATED A1C: CPT | Performed by: INTERNAL MEDICINE

## 2023-02-15 PROCEDURE — 82947 ASSAY GLUCOSE BLOOD QUANT: CPT | Performed by: INTERNAL MEDICINE

## 2023-02-15 NOTE — ADDENDUM NOTE
Encounter addended by: Dereje Galaviz RN on: 2/15/2023 11:09 AM   Actions taken: Charge Capture section accepted

## 2023-02-15 NOTE — ADDENDUM NOTE
Encounter addended by: Nenita Bennett on: 2/15/2023 3:35 PM   Actions taken: Charge Capture section accepted

## 2023-03-06 LAB — HBA1C MFR BLD: NORMAL %

## 2023-03-07 NOTE — PROGRESS NOTES
[facility]    Outpatient Physical Therapy Discharge Note  Patient: Yolette Hayward  : 1975    Beginning/End Dates of Reporting Period:  22 to 22    Referring Provider: Dr. Shafer     Therapy Diagnosis: L HS tightness, low back pain      Client Self Report: Still feeling the hamstring.  Always feels it.  Has not been compliant with HEP.    Objective Measurements:  Objective Measure: HS on left: starts to feel stretch/discomfort at 45 degrees - stretch to 80 degrees     Objective Measure: Palpation: tenderness/tightness distal lateral HS     Objective Measure: mildly positive UE nerve glides  Details: ulnar, radial and median                          Goals:  Goal Identifier HEP   Goal Description Pt is independent with HEP to manage symptoms   Target Date 23   Date Met      Progress (detail required for progress note): has not been performing her ex's consistenly - reviewed today     Goal Identifier Work   Goal Description Pt will work for 8 hours with prolonged standing/walking without pain >2/10 in L HS   Target Date 22   Date Met  22   Progress (detail required for progress note): MET     Goal Identifier Yoga   Goal Description Pt will return to yoga without need for modifications without increased pain in L HS   Target Date 23   Date Met      Progress (detail required for progress note): has been performing but can feel hamstring discomfort     Goal Identifier     Goal Description     Target Date     Date Met      Progress (detail required for progress note):       Goal Identifier     Goal Description     Target Date     Date Met      Progress (detail required for progress note):       Goal Identifier     Goal Description     Target Date     Date Met      Progress (detail required for progress note):       Goal Identifier     Goal Description     Target Date     Date Met       Progress (detail required for progress note):       Goal Identifier     Goal Description     Target Date     Date Met      Progress (detail required for progress note):             Plan:  Discharge from therapy.    Discharge:    Reason for Discharge: Pt has not been consistent about coming to therapy     Equipment Issued: none    Discharge Plan: Patient to continue home program.

## 2023-03-07 NOTE — ADDENDUM NOTE
Encounter addended by: Marifer Garcia, PT on: 3/7/2023 8:40 AM   Actions taken: Episode resolved, Pend clinical note, Flowsheet accepted, Clinical Note Signed

## 2023-03-10 ENCOUNTER — LAB REQUISITION (OUTPATIENT)
Dept: LAB | Facility: CLINIC | Age: 48
End: 2023-03-10

## 2023-03-10 ENCOUNTER — HOSPITAL ENCOUNTER (OUTPATIENT)
Dept: RESEARCH | Facility: CLINIC | Age: 48
Discharge: HOME | End: 2023-03-10

## 2023-03-10 LAB — HBA1C MFR BLD: 5.6 %

## 2023-03-10 PROCEDURE — 510N000009 HC RESEARCH FACILITY, PER 15 MIN

## 2023-03-10 PROCEDURE — 83036 HEMOGLOBIN GLYCOSYLATED A1C: CPT | Performed by: INTERNAL MEDICINE

## 2023-03-10 PROCEDURE — 510N000017 HC CRU PATIENT CARE, PER 15 MIN

## 2023-03-10 NOTE — ADDENDUM NOTE
Encounter addended by: Radha Marin RN on: 3/10/2023 12:07 PM   Actions taken: Charge Capture section accepted

## 2023-03-10 NOTE — ADDENDUM NOTE
Encounter addended by: Priscilla Vallejo LPN on: 3/10/2023 12:07 PM   Actions taken: Charge Capture section accepted

## 2023-04-16 ENCOUNTER — HEALTH MAINTENANCE LETTER (OUTPATIENT)
Age: 48
End: 2023-04-16

## 2023-06-01 ENCOUNTER — FCC EXTENDED DOCUMENTATION (OUTPATIENT)
Dept: PSYCHOLOGY | Facility: CLINIC | Age: 48
End: 2023-06-01
Payer: COMMERCIAL

## 2023-06-01 NOTE — PROGRESS NOTES
Received voicemail from pt Wed 5/31. Returned call today and left VM responding to questions about upcoming ADHD evaluation appointments. Confirmed the visits will be virtual and explained step-wise protocol for testing.

## 2023-06-19 ENCOUNTER — VIRTUAL VISIT (OUTPATIENT)
Dept: PSYCHOLOGY | Facility: CLINIC | Age: 48
End: 2023-06-19
Payer: COMMERCIAL

## 2023-06-19 DIAGNOSIS — F33.0 MAJOR DEPRESSIVE DISORDER, RECURRENT EPISODE, MILD WITH ANXIOUS DISTRESS (H): ICD-10-CM

## 2023-06-19 PROCEDURE — 90791 PSYCH DIAGNOSTIC EVALUATION: CPT | Mod: VID | Performed by: PSYCHOLOGIST

## 2023-06-19 ASSESSMENT — ANXIETY QUESTIONNAIRES
3. WORRYING TOO MUCH ABOUT DIFFERENT THINGS: SEVERAL DAYS
6. BECOMING EASILY ANNOYED OR IRRITABLE: SEVERAL DAYS
1. FEELING NERVOUS, ANXIOUS, OR ON EDGE: MORE THAN HALF THE DAYS
GAD7 TOTAL SCORE: 5
5. BEING SO RESTLESS THAT IT IS HARD TO SIT STILL: NOT AT ALL
GAD7 TOTAL SCORE: 5
IF YOU CHECKED OFF ANY PROBLEMS ON THIS QUESTIONNAIRE, HOW DIFFICULT HAVE THESE PROBLEMS MADE IT FOR YOU TO DO YOUR WORK, TAKE CARE OF THINGS AT HOME, OR GET ALONG WITH OTHER PEOPLE: SOMEWHAT DIFFICULT
2. NOT BEING ABLE TO STOP OR CONTROL WORRYING: SEVERAL DAYS
7. FEELING AFRAID AS IF SOMETHING AWFUL MIGHT HAPPEN: NOT AT ALL

## 2023-06-19 ASSESSMENT — COLUMBIA-SUICIDE SEVERITY RATING SCALE - C-SSRS
TOTAL  NUMBER OF INTERRUPTED ATTEMPTS LIFETIME: NO
6. HAVE YOU EVER DONE ANYTHING, STARTED TO DO ANYTHING, OR PREPARED TO DO ANYTHING TO END YOUR LIFE?: NO
TOTAL  NUMBER OF ABORTED OR SELF INTERRUPTED ATTEMPTS LIFETIME: NO
2. HAVE YOU ACTUALLY HAD ANY THOUGHTS OF KILLING YOURSELF?: NO
1. HAVE YOU WISHED YOU WERE DEAD OR WISHED YOU COULD GO TO SLEEP AND NOT WAKE UP?: NO
ATTEMPT LIFETIME: NO

## 2023-06-19 ASSESSMENT — PATIENT HEALTH QUESTIONNAIRE - PHQ9
SUM OF ALL RESPONSES TO PHQ QUESTIONS 1-9: 5
5. POOR APPETITE OR OVEREATING: NOT AT ALL
10. IF YOU CHECKED OFF ANY PROBLEMS, HOW DIFFICULT HAVE THESE PROBLEMS MADE IT FOR YOU TO DO YOUR WORK, TAKE CARE OF THINGS AT HOME, OR GET ALONG WITH OTHER PEOPLE: SOMEWHAT DIFFICULT
SUM OF ALL RESPONSES TO PHQ QUESTIONS 1-9: 5
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
## 2023-06-26 ENCOUNTER — VIRTUAL VISIT (OUTPATIENT)
Dept: PSYCHOLOGY | Facility: CLINIC | Age: 48
End: 2023-06-26
Payer: COMMERCIAL

## 2023-06-26 DIAGNOSIS — F33.0 MAJOR DEPRESSIVE DISORDER, RECURRENT EPISODE, MILD WITH ANXIOUS DISTRESS (H): Primary | ICD-10-CM

## 2023-06-26 PROCEDURE — 90834 PSYTX W PT 45 MINUTES: CPT | Mod: VID | Performed by: PSYCHOLOGIST

## 2023-06-26 NOTE — PROGRESS NOTES
"Progress Note       Client Name:  Yolette Hayward Date: 6/26/2023         Service Type: Individual      Telemedicine Visit: The patient's condition can be safely assessed and treated via synchronous audio and visual telemedicine encounter.        Reason for Telemedicine Visit: Services only offered telehealth      Originating Site (Patient Location): Patient's home            Distant Location (provider location):? Off-site      Consent:  The patient/guardian has verbally consented to: the potential risks and benefits of telemedicine (video visit) versus in person care; bill my insurance or make self-payment for services provided; and responsibility for payment of non-covered services.       Mode of Communication:? Video Conference via [employer]      As the provider I attest to compliance with applicable laws and regulations related to telemedicine.        Session Start Time: 2:00  Session End Time: 2:38      Session Length: 38 minutes      Session #: 2       Attendees: Client attended alone      Intervention: reviewed strategies for managing depression and anxiety; motivational interviewing: explored potential barriers for making healthy changes      Identifying Information:   Client is a 48 year old, , partnered / significant other female. Client was referred for a diagnostic assessment by PCP. The purpose of this evaluation is to: provide treatment recommendations and clarify diagnosis. Client is currently employed full time and reports she is able to function appropriately at work. Client attended the session alone.          Client's Statement of Presenting Concern:   Client reported seeking services at this time for diagnostic assessment and recommendations for treatment. Client's presenting concerns include: She is chronically late (\"extremely late\"). She has difficulty with time management. She is \"extremely indecisive.\" She has a hard time organizing things. When cleaning she is \"all over the place.\" " "She will jump from one thing to the next without finishing things. She leaves things incomplete. She has stacks of paperwork and then she doesn't follow through and file it and organize it. She has piles of clothes everywhere. She has always been messy. She is a messy cook. When she is leaving the house, she has to go back in to grab things. She will misplace or lose things at times (used to be more of an issue but now she puts items in a designated place). She will lose her phone often. No matter how much time she gives herself, she is always running late. She is rushing out the door to get places. She can be forgetful. She has a hard time retaining information. She uses calendars and lists/planners to stay on top of activities/appointments. Client used to leave things at other people's houses (she has stopped bringing as many items as she used to). She can have difficulty listening when others are talking. She experiences some anxiety in conversations (feeling self-conscious). She feels she is impatient and will interrupt people. She might \"butt into\" conversations. It is hard to wait her turn. She can relax and sit still (sometimes depression causes her to lay in bed and rest). She likes to paint, knit and read. She can focus on these things. Client stated that symptoms have resulted in the following functional impairments: management of the household and or completion of tasks.                History of Presenting Concern:   Client reported that she has not completed a previous ADHD diagnostic assessment. Client has not received a previous diagnosis of ADHD. Client reported that medication has not been prescribed medication to address these problems. Client reported that these problems began in childhood. Client has not attempted to resolve these concerns in the past. Client reported that other professionals are involved in providing support / services. Client is prescribed Lexapro by PCP. She has been seeing " "her therapist for 8-9 years. They meet every week. She can be smith and has depression and social anxiety.         Social History:   As a child, client reported that she failed to complete assigned chores in the home environment, had problems getting ready for school in the morning, had problems with organization and keeping track of items, displayed argumentative or oppositional behaviors and had problems managing temper with frequent emotional outbursts (at home). Client would throw fits and didn't want to do chores. She would rush to get ready in the morning. She was always messy. Client reported no difficulty with childhood peer relationships.  As a child, client reported having sleep disturbance, including: insomnia (She had stomach aches at night and had to ask her sister to talk to her and tell her about her day; she had anxiety at night and \"needed some attention and care.\"  Client reported currently experiencing sleep disturbance, including: insomnia.  Her sleep is inconsistent. Sometimes she has issues with waking up in the night or she is unable to fall asleep. Client reported sleeping approximately 7 hours per night. Client reported that she has not completed a sleep study.  Client reported having a well balanced diet. There are not significant nutritional concerns.  Client reported sporadic exercise patterns.      Client's highest education level was college graduate. Client graduated high school in 1994. She estimated she obtained mostly As and Bs. During the elementary, middle, and high school years, patient recalls academic strengths in the area of math and science. Client reported experiencing academic problems in reading and writing (it was hard to put her thoughts down on paper; she struggled with English). Client did not identify any learning problems.  She had anxiety related to reading and reading out loud. She had \"shame and social anxiety\" and this made it difficult to read. Client did not " "receive tutoring services during the school years. Client did not receive special education services. Client reported no particular problems during the school years. She was a rule follower at school. She was a good student and was independent with her homework. She always finished her work on time. She procrastinated a bit, but was able to meet deadlines. She was able to pay attention in class for the most part. She was listening and engaged. She was quiet and was not disruptive. She sat quietly in her chair.      Client did attend post-secondary school. She graduated from college in 1999 from [employer] with a degree in Sociology (didn't get her diploma because she didn't apply for graduation so her diploma said 2001). She got As and Bs. 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 It was an online program. She finished this program in October 2022. This involved a lot of online coursework. She had difficulty focusing. She stated, \"I can be all or nothing. In the beginning I don't know where to focus and how to study. Then I get hyper-focused and I'm reading and want to read every chapter and know it very well.\" She is either \"scratching the surface\" or she is \"fully immersed in it\" and she feels \"burned out.\" Her study patterns are \"all or nothing.\" It doesn't feel easy to study. She spent a lot of time thinking about studying and trying to study. She would set an alarm to try to focus and study for 45 minutes and then do the dishes for 15 minutes. If she was in \"study mode\", then she couldn't break away from this \"I wanted to take advantage of how good I was feeling.\" It is hard for her to recall information. She is a good " "test taker but puts a lot of time into doing this. She feels she has a poor memory and this can be frustrating. She feels she forgets things easily. Client is late to everything and she was always rushing to class. She has always hated writing papers. She stated, \"I don't know how to focus on the topic and how to organize my thoughts.\" She would devote a lot of time and it was \"overkill\" at times. It was had for her to \"sort out information.\"     Client reported that she is currently employed full-time. Client reported that the current job is a good fit for her skills and personality. 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 Client reported that she been frequently late for work . She might show up a few minutes late for work (2-3 minutes but usually \"right in the knick of time\"). She is very detail oriented with her work. She doesn't make mistakes or miss things. When things get very busy, she feels \"more panicked\" and it is hard for her to focus and know what her priority is. 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 Her current role doesn't involve working with acute care, and this is a better fit. She gets good job performance reviews, but she can feel overwhelmed at times. Her work is more complicated, but it is not as fast-paced and she has a supportive supervisor. She likes to be busy and dislikes when work is slow. She doesn't want it to be \"too busy or chaotic.\" She is able to follow procedures for her work which is quite structured/routine. The procedures are technical and require that she refers back to various steps to review what is expected. She also might check in with people to make sure she is doing things correctly. When she is working on something, she " "feels pulled to be focused on work and does not want to talk to other people (\"I'm pretty focused\"). If things aren't busy, she can be social and chat, but if there is work to be done, she is focused on getting it done. 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 She has a tendency to stay for a long time at jobs.  The longest period of employment has been 13-14 years. \ Client has not been terminated from a place of employment.          Risk Taking Behaviors:   Client reported a history of the following risk taking behaviors: substance use (experimenting with substances in the past); can over think decisions or can be spontaneous (hard to make plans and wants to do something \"in the moment\")        Motor Vehicle Operation:   Client has received a 's license.  Client has received moving violations, including: one speeding tickets.  Client reported the following driving habits: attentive and cautious, frequently late for appointments, meetings, or work and often exceeds the speed limit / speeds. She rushes to get to work on time.  According to client, other people are comfortable riding as a passenger when she is driving.           Mental Status Assessment:   Appearance:   Appropriate    Eye Contact:   Good    Psychomotor Behavior: Normal    Attitude:   Cooperative    Orientation:   All   Speech   Rate / Production: Hyperverbal  Volume:  Normal    Mood:    Normal   Affect:    Appropriate    Thought Content:  Clear    Thought Form:  Coherent  Logical    Insight:    Good          Review of Symptoms:   Depression:     Change in sleep, Lack of interest, Excessive or inappropriate guilt, Change in energy level and Feeling sad, down, or depressed  Joi:             No Symptoms  Psychosis:       No Symptoms  Anxiety:           Excessive worry, Nervousness and Poor concentration  Panic:          "     No symptoms  Post Traumatic Stress Disorder:  Experienced traumatic event emotional abuse in childhood   Eating Disorder:          No Symptoms  ADD / ADHD:              Poor task completion, Poor organizational skills and Distractibility  Conduct Disorder:       No symptoms  Autism Spectrum Disorder:     No symptoms  Obsessive Compulsive Disorder:       No Symptoms  Reckless Behavior: No symptoms            Safety Issues and Plan for Safety and Risk Management:   Client denies a history of suicidal ideation, suicide attempts, self-injurious behavior, homicidal ideation, homicidal behavior and and other safety concerns      Client denies current fears or concerns for personal safety.   Client denies current or recent suicidal ideation or behaviors.   Client denies current or recent homicidal ideation or behaviors.   Client denies current or recent self injurious behavior or ideation.   Client denies other safety concerns.   Client reports there are no firearms in the house.   Recommended that patient call 911 or go to the local ED should there be a change in any of these risk factors.            Diagnostic Criteria:   Attention Deficit Hyperactivity Disorder  A) A persistent pattern of inattention and/or hyperactivity-impulsivity that interferes with functioning or development, as characterized by (1) Inattention and/or (2) Hyperactivity and Impulsivity  - Often has difficulty sustaining attention in tasks or play activities  - Often has difficulty organizing tasks and activities  - Often avoids, dislikes, or is reluctant to engage in tasks that require sustained mental effort  - Often loses things necessary for tasks or activities  - Is often easily distractedby extraneous stimuli  - Is often forgetful in daily activities       Unspecified Anxiety Disorder  The client does not report enough symptoms for the full criteria of any specific Anxiety Disorder to have been met   - Restlessness or feeling keyed up or on  edge.    - Being easily fatigued.    - Difficulty concentrating or mind going blank.  Major Depressive Disorder   - Depressed mood. Note: In children and adolescents, can be irritable mood.     - Diminished interest or pleasure in all, or almost all, activities.    - Increased sleep.    - Fatigue or loss of energy.       Functional Status:   Client's symptoms have caused reduced functional status in the following areas: management of the household and or completion of tasks.           DSM-5Diagnoses: (Sustained by DSM5 Criteria Listed Above)       (F33.0) Major Depressive Disorder, Recurrent Episode, Mild _ and With anxious distress    RULE OUT: ADHD    Attendance Agreement:   Client has signed Attendance Agreement:No: unable to sign via telehealth         Preliminary Plan:   The client reports no currently identified Sikh, ethnic or cultural issues relevant to therapy.       services are not indicated.      Modifications to assist communication are not indicated.      Collaboration / coordination of treatment will be initiated with the following support professionals: primary care physician and outpatient therapist.      Referral to another professional/service is not indicated at this time..      A Release of Information is not needed at this time. TBD if ALESIA is warranted     Client was given self and collaborative rating scales to be completed prior to the next appointment.  Client consented to sending/receiving these measures via email.   Depression and anxiety rating scales were completed.  A third appointment was not scheduled at this time.       Report to child / adult protection services was NA.      Patient will have open access to their mental health medical record.      Imelda Gonzalez, PhD, LP  June 26, 2023

## 2023-06-27 ENCOUNTER — DOCUMENTATION ONLY (OUTPATIENT)
Dept: PSYCHOLOGY | Facility: CLINIC | Age: 48
End: 2023-06-27
Payer: COMMERCIAL

## 2023-06-27 NOTE — PROGRESS NOTES
Name: Yolette Hayward  MRN: [id number]  : 1975    Client completed the [place] Multiphasic Personality Inventory-2 (MMPI-2), a self-report personality inventory, as part of her evaluation. Validity scales indicate that the client responded in an open and consistent manner, resulting in a valid profile. While overreporting is possible, it is also likely that the Client has limited resources for coping with the stresses and demands of daily life. The following results should be interpreted with caution and in light of other information. Her profile reflects a high level of emotional distress. Individuals with similar profiles experience depression with tension, anxiety, worry, intrusive thoughts, insecurity, and apprehensiveness. Individuals with similar profiles may ruminate about personal shortcomings, over-anticipate negative outcomes, and overreact to minor problems and mistakes. They may be withdrawn and introverted, feel awkward and self-conscious, and be easily embarrassed around others. They feel less capable, less competent, less intelligent, and less adequate than others.  They may experience self-doubt, reduced occupational performance, problems with concentration, memory, and judgment. They may also experience vegetative symptoms of depression such as anhedonia, sleep disturbance, and loss of interest, energy and motivation. They may experience a sense of mental failure or decline and the depletion of energy needed to accomplish mental work. Thinking and problem-solving are experienced as effortful and as subject to going off course even when significant effort is made. Thinking may be viewed as impaired or unreliable; they may have a sense that  I can t seem to get my mind right.  Individuals with similar profiles report impediments to performance in the workplace including tension, worry, distractibility, indecision and feeling overwhelmed.

## 2023-07-09 ENCOUNTER — HEALTH MAINTENANCE LETTER (OUTPATIENT)
Age: 48
End: 2023-07-09

## 2023-07-26 ENCOUNTER — TELEPHONE (OUTPATIENT)
Dept: PSYCHOLOGY | Facility: CLINIC | Age: 48
End: 2023-07-26
Payer: COMMERCIAL

## 2023-07-26 ENCOUNTER — DOCUMENTATION ONLY (OUTPATIENT)
Dept: PSYCHOLOGY | Facility: CLINIC | Age: 48
End: 2023-07-26
Payer: COMMERCIAL

## 2023-07-26 NOTE — TELEPHONE ENCOUNTER
Spoke with pt about ADHD Rating Scales. Was able to locate both collateral and self-report packets and will be in touch with pt ASAP about next steps for ADHD evaluation. Apologized for the delay and miscommunication.

## 2023-07-26 NOTE — PROGRESS NOTES
"Name: Yolette Hayward  MRN: [id number]  : 1975     Lul Adult ADHD Rating Scale-IV: Self and Other Reports (BAARS-IV)   The BAARS-IV assesses for symptoms of ADHD that are experienced in one's daily life. This assessment measure includes self and collateral rating scales designed to provide information regarding current and childhood symptoms of ADHD including inattention, hyperactivity, and impulsivity. Self-report scores are reported as percentiles. Scores at the 76th-83rd percentile are considered marginal, scores at the 84th-92nd percentile are considered borderline, scores at the 93rd-95th percentile are considered mild, scores at the 96th-98th percentile are considered moderate, and those at the 99th percentile are considered severe. Collateral or \"other\" rating scales are reported as number of symptoms observed in comparison to those reported by the client. Norms and percentile scores are not available for collateral reports.    ?   Current Symptoms Scale--Self Report:    Client completed the self-report inventory of current symptoms. The results indicate that the client's Total ADHD Score was 37 which places them in the 94th percentile for overall ADHD symptoms. In addition, the client endorsed the following occur \"often\" or \"very often\": 3/9 (95th percentile) Inattention symptoms, 2/9 (91st?percentile) Hyperactivity/Impulsivity symptoms, and 4/9 (92nd percentile) Sluggish Cognitive Tempo symptoms. Overall, the results suggest the client is reporting mild symptoms of inattention and borderline symptoms of hyperactivity/impulsivity at this time.    ?   Current Symptoms Scale--Other Report:   Client's partner completed the collateral report inventory of current symptoms. Based on the collateral contact's observation of symptoms, the client demonstrates the following \"often\" or \"very often\": 4/9 Inattention symptoms, 0/5 Hyperactivity symptoms, 0/4 Impulsivity symptoms, and 3/9 Sluggish Cognitive Tempo " "symptoms. The client's Total ADHD Score was 39. The collateral- and self-report scores are similar and suggest Client experiences symptoms of inattention at this time.      Childhood Symptoms Scale--Self-Report:   Client completed the self-report inventory of childhood symptoms. The results indicate that the client's Total ADHD Score was 31 which places them in the 76th percentile for overall ADHD symptoms in childhood. In addition, the client endorsed having experienced the following \"often\" or \"very often\": 2/9 (88th percentile) Inattention symptoms and 2/9 (88th percentile) Hyperactivity-Impulsivity symptoms. Overall, the results suggest the client experienced borderline symptoms of inattention and borderline symptoms of hyperactivity/impulsivity in childhood.      Childhood Symptoms Scale--Other Report:   Client's mother completed the collateral report inventory of childhood symptoms. Based on the collateral contact's recollection of client's childhood symptoms, the client demonstrated the following \"often\" or \"very often\": 0/9 Inattention symptoms and 0/9 Hyperactivity-Impulsivity symptoms. The client's Total ADHD Score was 20. The collateral-report and self-report scores are discrepant. Client's mother did not note symptoms of ADHD in childhood.    Lul Functional Impairment Scale: Self and Other Reports (BFIS)   The BFIS is used to assess an individuals' psychosocial impairment in major life/daily activities that may be due to a mental health disorder. This assessment measure includes self and collateral rating scales. Self-report scores are reported as percentiles. Scores at the 76th-83rd percentile are considered marginal, scores at the 84th-92nd percentile are considered borderline, scores at the 93rd-95th percentile are considered mild, scores at the 96th-98th percentile are considered moderate, and those at the 99th percentile are considered severe. Collateral or \"other\" rating scales are reported as " "number of symptoms observed in comparison to those reported by the client. Norms and percentile scores are not available for collateral reports.    ?   Results indicate the client identified impairment (scores at or greater than 93rd percentile) in the following area: social-strangers. The client's Mean Impairment Score was 3.16 (51-75th percentile) indicating the client is not reporting impairment in functioning across domains. Client's partner completed the collateral rating scale, which indicated similar results (e.g., Mean Impairment Score of 3.7). He did not note impairments in any areas.     Lul Deficits in Executive Functioning Scale (BDEFS)   The BDEFS is a measure used for evaluating dimensions of adult executive functioning in daily life. This assessment measure includes self and collateral rating scales. Self-report scores are reported as percentiles. Scores at the 76th-83rd percentile are considered marginal, scores at the 84th-92nd percentile are considered borderline, scores at the 93rd-95th percentile are considered mild, scores at the 96th-98th percentile are considered moderate, and those at the 99th percentile are considered severe. Collateral or \"other\" rating scales are reported as number of symptoms observed in comparison to those reported by the client. Norms and percentile scores are not available for collateral reports.    ?   Results indicate the client's Total Executive Functioning Score was 246 (99th percentile). The ADHD-Executive Functioning Index score was 31 (98th percentile). These scores suggest the client is reporting moderate to severe deficits in executive functioning. These deficits may be due to ADHD or other mental health conditions. They noted the following deficits: self-management to time (severe); self-organization/problem-solving (moderate); and self-restraint (severe). Client's partner completed the collateral report which indicated similar results.     Generalized " Anxiety Disorder Questionnaire (SALLIE-7)   This questionnaire is designed to screen for anxiety in adults. Based on the client's score of 5, they are reporting mild symptoms of anxiety at this time. Client identified the following symptoms of anxiety: feeling nervous/anxious/on edge; worrying too much about different things; not being able to stop or control worrying; and becoming easily annoyed or irritable.  ?   Patient Health Questionnaire- 9 (PHQ-9)    This questionnaire is designed to screen for depression in adults. Based on the client's score of 9, they are reporting mild symptoms of depression at this time. Client identified the following symptoms of depression: little interest or pleasure in doing things; feeling down/depressed/hopeless; trouble falling asleep/staying asleep/sleeping too much; feeling tired or having little energy; and feeling bad about self.

## 2023-08-02 ENCOUNTER — DOCUMENTATION ONLY (OUTPATIENT)
Dept: PSYCHOLOGY | Facility: CLINIC | Age: 48
End: 2023-08-02
Payer: COMMERCIAL

## 2023-08-02 NOTE — PROGRESS NOTES
CNS Vital Signs Neurocognitive Battery   The CNS Vital Signs Neurocognitive Battery is a remotely-administered assessment comprised of seven core subtests to individually measure the patient's verbal memory, visual memory, motor speed, psychomotor speed, reaction time, focus, ability to sustain attention and ability to adapt to changing rules and tasks.        Above average domain scores indicate a standard score (SS) greater than 109 or a Percentile Rank (ND) greater than 74, indicating a high functioning test subject. Average is a SS  or ND 25-74, indicating normal function. Low Average is a SS 80-89 or ND 9-24 indicating a slight deficit or impairment. Below Average is a SS 70-79 or ND 2-8, indicating a moderate level of deficit or impairment. Very Low is a SS less than 70 or a ND less than 2, indicating a deficit and impairment.  Validity Indicator denotes a guideline for representing the possibility of an invalid test or domain score, and can be influenced by patient understanding, effort, or other conditions.     The patient's results are detailed below:      Domain  Standard Score  Percentile  Description  Validity    Neurocognitive Index   104 61 Average Y   Composite?Memory?Measure  106 66 Average Y   Verbal Memory  125 95 Above Average Y   Visual?Memory  86 18 Low Average Y   Psychomotor Speed  111 77 Above Average Y   Reaction Time  91 27 Average Y   Complex Attention  108 70 Average Y   Cognitive Flexibility  104 61 Average Y   Processing Speed  115 84 Above Average Y   Executive Function  105 63 Average Y   Simple Attention  94 34 Average Y   Motor Speed  102 55 Average Y      Neurocognitive?Index?(NCI): Measures an average score derived from the domain scores or a general assessment of the overall neurocognitive status of the patient. The patient's NCI score is 104, with a percentile of 61, and falls within the Average range.      Composite?Memory: Measures how well subject can recognize,  remember, and retrieve words and geometric figures, and is comprised of the Visual and Verbal Memory domains. The patient's Composite Memory score is 106, with a percentile of 66, and falls within the Average range.      Verbal?Memory: Measures how well subject can recognize, remember, and retrieve words. The patient's Verbal Memory score is 125, with a percentile of 95, and falls within the Above Average range.      Visual?Memory: Measures how well subject can recognize, remember and retrieve geometric figures. The patient's Visual Memory score is 86, with a percentile of 18, and falls within the Low Average range.      Psychomotor?Speed: Measures how well a subject perceives, attends, responds to complex visual-perceptual information and performs simple fine motor coordination, and is comprised of the Motor Speed and Processing Speed indexes. The patient's Psychomotor Speed score is 111, with a percentile of 77, and falls within the Above Average range.      Reaction?Time: Measures how quickly the subject can react, in milliseconds, to a simple and increasingly complex direction set. The patient's Reaction Time score is 91, with a percentile of 27, and falls within the Average range.      Complex?Attention: Measures the ability to track and respond to a variety of stimuli over lengthy periods of time and/or perform complex mental tasks requiring vigilance quickly and accurately. The patient's Complex Attention score is 108, with a percentile of 70, and falls within the Average range.      Cognitive?Flexibility: Measures how well subject is able to adapt to rapidly changing and increasingly complex set of directions and/or to manipulate the information. The patient's Cognitive Flexibility score is 104, with a percentile of 61, and falls within the Average range.      Processing?Speed: Measures how well a subject recognizes and processes information i.e., perceiving, attending/responding to incoming information,  motor speed, fine motor coordination, and visual-perceptual ability. The patient's Processing Speed score is 115, with a percentile of 84, and falls within the Above Average range.      Executive?Function: Measures how well a subject recognizes rules, categories, and manages or navigates rapid decision making. The patient's Executive Function score is 105, with a percentile of 63, and falls within the Average range.      Simple?Attention: Measures the ability to track and respond to a single defined stimulus over lengthy periods of time while performing vigilance and response inhibition quickly and accurately to a simple task. The patient's Simple Attention score is 94, with a percentile of 34, and falls within the Average range.      Motor?Speed: Measure: Ability to perform simple movements to produce and satisfy an intention towards a manual action and goal. The patient's Motor Speed score is 102, with a percentile of 55, and falls within the Average range.

## 2023-08-08 ENCOUNTER — DOCUMENTATION ONLY (OUTPATIENT)
Dept: PSYCHOLOGY | Facility: CLINIC | Age: 48
End: 2023-08-08
Payer: COMMERCIAL

## 2023-08-08 NOTE — PROGRESS NOTES
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 then she couldn't break away from this \"I wanted to take advantage of how good I was feeling.\" It is hard for her to recall information. She is a good test taker but puts a lot of time into doing this. She feels she has a poor memory, and this can be frustrating. She feels she forgets things easily. Client is late to everything, and she was always rushing to class. She has always hated writing papers. She stated, \"I don't know how to focus on the topic and how to organize my thoughts.\" She would devote a lot of time and it was \"overkill\" at times. It was hard for her to \"sort out information.\"     Client reported that she is currently employed full-time. Client reported that the current job is a good fit for her skills and personality. 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 She has held this job for 6 months. She was at [employer] in their lab for 6.5 years. Client reported that she been frequently late for work . She might show up a few minutes late for work (2-3 minutes but usually \"right in the nick of time\"). She is very detail oriented with her work. She doesn't make mistakes or miss things. When things get very busy, she feels \"more panicked\" and it is hard for her to focus and know what her priority is. 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 Her current role doesn't involve working with acute care, and this is a better fit. She gets good job performance reviews, but she can feel overwhelmed at times. Her work is more complicated, but it is not as fast paced, and she has a supportive supervisor. She likes to be busy and dislikes when work is slow. She doesn't want it to be \"too busy or chaotic.\" She is able to follow procedures for her work which is quite structured/routine. The procedures are technical and require that she refers back to various steps to review what is expected. She also might check in with people to make sure she is doing things correctly. When she is working on something, she feels pulled to be focused on work and does not want to talk to other people (\"I'm pretty focused\"). If things aren't busy, she can be social and chat, but if there is work to be done, she is focused on getting it done. 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 She has a tendency to stay for a long time at jobs. The longest period of employment has been 13-14 years. Client has not been terminated from a place of employment.       Results of testing were not indicative of ADHD. Client reported symptoms of inattention at this time. However, neither Client nor her mother reported symptoms of ADHD in childhood. Client did note experiencing anxiety from a young age (). Despite anxiety, Client described academic success during her school years. She noted struggling in college when she prioritized socializing, but she was able to change her behavior and do well. Client completed a brief virtual assessment examining brief core brain function domains. Results of this assessment demonstrated that, overall, Client's scores fell in the Average range. There were no " deficits that would be expected if Client had ADHD. Personality testing was positive for depression and anxiety. Rating scales suggest Client experiences mild anxiety and mild depression at this time.    Based on the results of clinical interview and psychological testing, the client currently meets criteria for diagnoses of Major Depressive Disorder, Recurrent, Mild with anxious distress. Client will be provided with the results of testing, diagnosis, and recommendations in her last appointment.         DSM5 Diagnoses: (Sustained by DSM5 Criteria Listed Above)       MDD, Recurrent, Mild with anxious distress (F33.0)    Psychosocial & Contextual Factors: history of trauma; working full-time       Recommendations:      1. Schedule a medication evaluation with your physician. Medications are often beneficial in treating depression and anxiety symptoms.    2. Individual therapy is recommended. Therapies focused on identifying and challenging problematic thought and behavior patterns while increasing the use of healthy coping skills has been found to be effective in treating depression and anxiety. It will be important to set goals in this therapy and work actively toward achieving short-term successes that lead to the completion of each goal. Action-oriented therapies, such as CBT and ACT (Acceptance and Commitment Therapy) are particularly recommended for the treatment of chronic depression and anxiety.    ?   3. The use of behavioral strategies such as diaphragmatic breathing, guided imagery, and mindfulness is often helpful in the management of anxiety symptoms.?      4. ?The following compensatory strategies may be useful to cope with reported inattention symptoms:    a. Maintaining a predictable routine and structured environment that incorporates prioritized checklists and reminders (e.g., Post-Its).   b. When completing tasks, try to focus on one task at a time and complete it in its entirety before moving on to  the next task.   c. Minimize background distractions when working on complex tasks. For example, TV, radio or ongoing conversations in the background may hinder ability to focus on the task at hand.   d. Take regular breaks from tasks that require prolonged attention. In general, regular breaks from complex tasks can help prevent lapses in attention, which can result in errors.   e. Outline the steps required to complete a task prior to beginning it, which can help ensure an organized approach. Use the outline to refer to throughout the task as a reminder of the steps to be completed.     Imelda Gonzalez, PhD, LP   Licensed Psychologist

## 2023-08-16 ENCOUNTER — VIRTUAL VISIT (OUTPATIENT)
Dept: PSYCHOLOGY | Facility: CLINIC | Age: 48
End: 2023-08-16
Payer: COMMERCIAL

## 2023-08-16 DIAGNOSIS — F33.0 MAJOR DEPRESSIVE DISORDER, RECURRENT EPISODE, MILD WITH ANXIOUS DISTRESS (H): Primary | ICD-10-CM

## 2023-08-16 PROCEDURE — 96131 PSYCL TST EVAL PHYS/QHP EA: CPT | Mod: GT | Performed by: PSYCHOLOGIST

## 2023-08-16 PROCEDURE — 96130 PSYCL TST EVAL PHYS/QHP 1ST: CPT | Mod: GT | Performed by: PSYCHOLOGIST

## 2023-08-16 ASSESSMENT — PATIENT HEALTH QUESTIONNAIRE - PHQ9
SUM OF ALL RESPONSES TO PHQ QUESTIONS 1-9: 4
10. IF YOU CHECKED OFF ANY PROBLEMS, HOW DIFFICULT HAVE THESE PROBLEMS MADE IT FOR YOU TO DO YOUR WORK, TAKE CARE OF THINGS AT HOME, OR GET ALONG WITH OTHER PEOPLE: SOMEWHAT DIFFICULT
SUM OF ALL RESPONSES TO PHQ QUESTIONS 1-9: 4

## 2023-08-16 NOTE — PROGRESS NOTES
"Client Name: Yolette Hayward   Date: 8/16/23       Service Type: Individual (ADHD Evaluation feedback session)   ?   Session Start Time: 8:00 Session End Time: 8:20     ?   Session Length: 20 minutes    ?   Session #: (feedback)   ?   Attendees: Client attended alone      The patient has been notified of the following:      \"We have found that certain health care needs can be provided without the need for a face to face visit.  This service lets us provide the care you need with a phone conversation.       I will have full access to your [facility] medical record during this entire phone call.   I will be taking notes for your medical record.      Since this is like an office visit, we will bill your insurance company for this service.       There are potential benefits and risks of telephone visits (e.g. limits to patient confidentiality) that differ from in-person visits.?  Confidentiality still applies for telephone services, and nobody will record the visit.  It is important to be in a quiet, private space that is free of distractions (including cell phone or other devices) during the visit.??      If during the course of the call I believe a telephone visit is not appropriate, you will not be charged for this service\"     Consent has been obtained for this service by care team member: Yes      ? Telemedicine Visit: The patient's condition can be safely assessed and treated via synchronous audio and visual telemedicine encounter.        Reason for Telemedicine Visit: Services only offered telehealth      Originating Site (Patient Location): Patient's home            Distant Location (provider location):? Off-site      Consent:  The patient/guardian has verbally consented to: the potential risks and benefits of telemedicine (video visit) versus in person care; bill my insurance or make self-payment for services provided; and responsibility for payment of non-covered services.       Mode of Communication:? Video " Conference via [employer]      As the provider I attest to compliance with applicable laws and regulations related to telemedicine.      ?   DATA   ?   ?   Treatment Objectives Addressed in This Session:    Provided feedback on ADHD evaluation. Reviewed test results in depth and answered client's questions. Client diagnosed with Major Depressive Disorder, Recurrent, Mild with anxious distress. This provider also completed full written report of evaluation, including integration of testing data, summary, and recommendations. Please see Documentation Only dated 8/8/23.   ?   Progress on / Status of Treatment Objective(s) / Homework:    Completed    ?   Intervention:   ADHD Evaluation feedback; Reviewed report (can be found in Documentation Only encounter dated 8/8/23); Client was appreciative of the feedback and expressed understanding of the diagnosis.    ?   ?   ASSESSMENT: Current Emotional / Mental Status (status of significant symptoms):   Risk status (Self / Other harm or suicidal ideation)   Client denies current fears or concerns for personal safety.   Client denies current or recent suicidal ideation or behaviors.   Client denies current or recent homicidal ideation or behaviors.   Client denies current or recent self-injurious behavior or ideation.   Client denies other safety concerns.   A safety and risk management plan has not been developed at this time, however client was given the after-hours number / 911 should there be a change in any of these risk factors.   ?   Appearance: Unable to assess on phone   Eye Contact: Unable to assess on phone   Psychomotor Behavior: Unable to assess on phone   Attitude: Cooperative    Orientation: All   Speech   Rate / Production: Normal    Volume: Normal    Mood: Normal   Affect: Appropriate    Thought Content: Clear    Thought Form: Coherent Logical    Insight: Good    ?   Medication Review:   Client is prescribed Lexapro by PCP      Medication Compliance:   Yes  ?    Changes in Health Issues:   None reported   ?   Chemical Use Review:   Substance Use: Chemical use reviewed, recommended reducing/abstaining from cannabis and alcohol    ?   Tobacco Use: No current tobacco use.    ?   Collateral Reports Completed:   Routed note to Care Team Member(s)   ?   PLAN: (Homework, other)   ?   Recommendations:      1. Schedule a medication evaluation with your physician. Medications are often beneficial in treating depression and anxiety symptoms.     2. Individual therapy is recommended. Therapies focused on identifying and challenging problematic thought and behavior patterns while increasing the use of healthy coping skills has been found to be effective in treating depression and anxiety. It will be important to set goals in this therapy and work actively toward achieving short-term successes that lead to the completion of each goal. Action-oriented therapies, such as CBT and ACT (Acceptance and Commitment Therapy) are particularly recommended for the treatment of chronic depression and anxiety.    ?   3. The use of behavioral strategies such as diaphragmatic breathing, guided imagery, and mindfulness is often helpful in the management of anxiety symptoms.?      4. ?The following compensatory strategies may be useful to cope with reported inattention symptoms:    a. Maintaining a predictable routine and structured environment that incorporates prioritized checklists and reminders (e.g., Post-Its).   b. When completing tasks, try to focus on one task at a time and complete it in its entirety before moving on to the next task.   c. Minimize background distractions when working on complex tasks. For example, TV, radio or ongoing conversations in the background may hinder ability to focus on the task at hand.   d. Take regular breaks from tasks that require prolonged attention. In general, regular breaks from complex tasks can help prevent lapses in attention, which can result in errors.    e. Outline the steps required to complete a task prior to beginning it, which can help ensure an organized approach. Use the outline to refer to throughout the task as a reminder of the steps to be completed.      Imelda Gonzalez, PhD, LP   Licensed Psychologist     Psychological Testing    Billing/Services Summary    ?    Testing Evaluation Services  Base: 80110   (1st 60 mins)  Add-on: 37442   (each addtl 60 mins)    Record Review and Clarify Referral Question    (12:40/1:00), (6/19/23)  20 minutes    Integration/Report Generation    (3:00/4:00), (6/27/23) - MMPI-2   (12:00/1:00), (7/26/23) - Lul Scales   (11:00/12:00), (8/2/23) - CNS Vital Signs  (3:00/4:00), (8/8/23) - Report  60 minutes   60 minutes   60 minutes   60 minutes   (8:00/8:20), (8/16/23) - Interactive Feedback session   20 minutes    Total Time:  280 minutes (4 hours, 40 minutes)     Total Units:  1  4   ?    ?    Diagnosis(es): (ICD-10)   MDD, Recurrent, Mild with anxious distress (F33.0)

## 2023-09-20 ENCOUNTER — LAB REQUISITION (OUTPATIENT)
Dept: LAB | Facility: CLINIC | Age: 48
End: 2023-09-20

## 2023-09-20 ENCOUNTER — HOSPITAL ENCOUNTER (OUTPATIENT)
Dept: RESEARCH | Facility: CLINIC | Age: 48
Discharge: HOME | End: 2023-09-20
Payer: COMMERCIAL

## 2023-09-20 LAB
CHOLEST SERPL-MCNC: 148 MG/DL
CRP SERPL HS-MCNC: 2.98 MG/L
ERYTHROCYTE [DISTWIDTH] IN BLOOD BY AUTOMATED COUNT: 13.2 % (ref 10–15)
GLUCOSE SERPL-MCNC: 92 MG/DL (ref 70–99)
HBA1C MFR BLD: 5.8 %
HCT VFR BLD AUTO: 36.1 % (ref 35–47)
HDLC SERPL-MCNC: 71 MG/DL
HGB BLD-MCNC: 11.7 G/DL (ref 11.7–15.7)
HOLD SPECIMEN: NORMAL
INSULIN SERPL-ACNC: 4.2 UU/ML (ref 2.6–24.9)
LDLC SERPL CALC-MCNC: 69 MG/DL
MCH RBC QN AUTO: 30.2 PG (ref 26.5–33)
MCHC RBC AUTO-ENTMCNC: 32.4 G/DL (ref 31.5–36.5)
MCV RBC AUTO: 93 FL (ref 78–100)
NONHDLC SERPL-MCNC: 77 MG/DL
PLATELET # BLD AUTO: 218 10E3/UL (ref 150–450)
RBC # BLD AUTO: 3.88 10E6/UL (ref 3.8–5.2)
T4 FREE SERPL-MCNC: 1.2 NG/DL (ref 0.9–1.7)
TRIGL SERPL-MCNC: 39 MG/DL
TSH SERPL DL<=0.005 MIU/L-ACNC: 0.84 UIU/ML (ref 0.3–4.2)
WBC # BLD AUTO: 5.8 10E3/UL (ref 4–11)

## 2023-09-20 PROCEDURE — 84443 ASSAY THYROID STIM HORMONE: CPT | Performed by: INTERNAL MEDICINE

## 2023-09-20 PROCEDURE — 80061 LIPID PANEL: CPT | Performed by: INTERNAL MEDICINE

## 2023-09-20 PROCEDURE — 84439 ASSAY OF FREE THYROXINE: CPT | Performed by: INTERNAL MEDICINE

## 2023-09-20 PROCEDURE — 510N000009 HC RESEARCH FACILITY, PER 15 MIN

## 2023-09-20 PROCEDURE — 510N000017 HC CRU PATIENT CARE, PER 15 MIN

## 2023-09-20 PROCEDURE — 83036 HEMOGLOBIN GLYCOSYLATED A1C: CPT | Performed by: INTERNAL MEDICINE

## 2023-09-20 PROCEDURE — 82947 ASSAY GLUCOSE BLOOD QUANT: CPT | Performed by: INTERNAL MEDICINE

## 2023-09-20 PROCEDURE — 83525 ASSAY OF INSULIN: CPT | Performed by: INTERNAL MEDICINE

## 2023-09-20 PROCEDURE — 86141 C-REACTIVE PROTEIN HS: CPT | Performed by: INTERNAL MEDICINE

## 2023-09-20 PROCEDURE — 300N000004 HC RESEARCH SPECIMEN PROCESSING, MODERATE

## 2023-09-20 PROCEDURE — 85027 COMPLETE CBC AUTOMATED: CPT | Performed by: INTERNAL MEDICINE

## 2023-09-20 NOTE — ADDENDUM NOTE
Encounter addended by: Priscilla Vallejo LPN on: 9/20/2023 3:26 PM   Actions taken: Charge Capture section accepted

## 2023-10-31 ENCOUNTER — OFFICE VISIT (OUTPATIENT)
Dept: FAMILY MEDICINE | Facility: CLINIC | Age: 48
End: 2023-10-31
Attending: FAMILY MEDICINE
Payer: COMMERCIAL

## 2023-10-31 VITALS
BODY MASS INDEX: 21.14 KG/M2 | HEART RATE: 70 BPM | WEIGHT: 100.7 LBS | DIASTOLIC BLOOD PRESSURE: 72 MMHG | HEIGHT: 58 IN | SYSTOLIC BLOOD PRESSURE: 110 MMHG

## 2023-10-31 DIAGNOSIS — T83.89XA HEAVY MENSES DUE TO IUD (H): ICD-10-CM

## 2023-10-31 DIAGNOSIS — N92.0 HEAVY MENSES DUE TO IUD (H): ICD-10-CM

## 2023-10-31 DIAGNOSIS — R73.03 PREDIABETES: Primary | ICD-10-CM

## 2023-10-31 DIAGNOSIS — R51.9 NEW ONSET OF HEADACHES: ICD-10-CM

## 2023-10-31 PROCEDURE — 99213 OFFICE O/P EST LOW 20 MIN: CPT | Performed by: FAMILY MEDICINE

## 2023-10-31 PROCEDURE — 99204 OFFICE O/P NEW MOD 45 MIN: CPT | Performed by: FAMILY MEDICINE

## 2023-10-31 RX ORDER — COPPER 313.4 MG/1
1 INTRAUTERINE DEVICE INTRAUTERINE ONCE
COMMUNITY

## 2023-10-31 NOTE — PROGRESS NOTES
"CC: [facility] (Est care. Had recent labs done for a study and her A1C is pre diabetic an CRP is high normal. New symptom of pounding headache at the beginning of work out.)      SUBJECTIVE: Yolette is a 48 year old female who comes in to [facility]. She was in a cold plunge study and had her labs drawn and wanted to review some results.     Prediabetes. New diagnosis in 9/2023.   - Most recent labs reviewed today.  Lab Results   Component Value Date    A1C 5.8 09/20/2023    A1C 5.6 03/10/2023     CRP concerns. CRP was 2.98, up to 3 is normal.     Headaches. In the last month or so, notices pounding sensation in her occiput when she works out (e-[employer] classes, [employer] class). Throbbing pain. Has stopped her from working out a couple times. Will get a headache within 3 minutes. It will last about 5 minutes but will have to slow down. Feels like it wouldn't be safe to continue working out. After she slow down, the headache will still be there for about a couple of hours (took ibuprofen and it helped). No nausea or vomiting. No vision changes. No syncope but when headache is really intense sometimes feels like she is about to pass out.     PMH, PSH, FH, allergies, and medications reviewed and updated in Epic.     She does feel fatigued a lot. She has a paragard IUD. Periods last for about 5 days. Heavier than pre-IUD. Uses Diva Cup.      OBJECTIVE:   /72 (BP Location: Left arm, Patient Position: Chair)   Pulse 70   Ht 1.473 m (4' 10\")   Wt 45.7 kg (100 lb 11.2 oz)   BMI 21.05 kg/m    General: Alert and oriented in no acute distress.  Skin: Clear without lesions or rashes.   Lymph: No anterior cervical lymphadenopathy.   Eyes: PERRL. EOMI.   ENT: TMs intact and pearly gray. Oropharynx moist without lesions or exudate. Supple neck.  Neuro: CN 2-12 intact. Normal finger to nose. Normal gait. Normal strength & sensation in b/l UE and LE.   Cardio: RRR, normal S1 and S2, without murmurs, rubs, or gallops " appreciated.    Resp: CTA bilaterally. Normal respiratory effort.   Psych: Mood and affect appropriate.      ASSESSMENT/PLAN:   Yolette was seen today for establish care.    Prediabetes  Lifestyle management discussed. Recheck A1c in 3 months.   -     Hemoglobin A1c; Future    Heavy menses due to IUD   R/o iron deficiency anemia with ferritin & Hgb.   -     Ferritin; Future  -     Hemoglobin; Future    New onset of headaches  New exercise-induced headaches over the last 3 months in a pt without prior history of headaches. Recommend brain MRI for evaluation to r/o structural cause, if normal consider prn indomethacin, propranolol, or naproxen. Worrisome signs and symptoms were discussed with Yolette and she was instructed to return to the clinic for concerning symptoms or to call with questions.  -     MR Brain w/o Contrast; Future      Germania Osborne MD  Family Medicine

## 2023-10-31 NOTE — NURSING NOTE
Chief Complaint   Patient presents with    [facility]. Had recent labs done for a study and her A1C is pre diabetic an CRP is high normal. New symptom of pounding headache at the beginning of work out.       See SANTY Melendrez 10/31/2023

## 2023-10-31 NOTE — PATIENT INSTRUCTIONS
Thank you for trusting us with your care!     If you need to contact us for questions about:  Symptoms, Scheduling & Medical Questions; Non-urgent (2-3 day response) Joi message, Urgent (needing response today) [phone] (if after 3:30pm next day response)   Prescriptions: Please call your Pharmacy   Billing: [facility] [phone] or [facility]:[phone]       A1c Jan 31 or later  Hemoglobin and ferritin anytime    MRI brain  Imaging scheduling  [facility] [phone] [facility] ([facility] Clinics[facility] including [facility], [facility] [phone] [facility], [facility] including [facility], and [facility] [phone] [facility], [facility], [facility], [facility] clinics, including [place], [facility], and [facility] [phone] [facility], [facility], [facility] Clinics, including [place], [facility], and [place]

## 2023-11-02 ENCOUNTER — ANCILLARY PROCEDURE (OUTPATIENT)
Dept: MRI IMAGING | Facility: CLINIC | Age: 48
End: 2023-11-02
Attending: FAMILY MEDICINE
Payer: COMMERCIAL

## 2023-11-02 DIAGNOSIS — R51.9 NEW ONSET OF HEADACHES: ICD-10-CM

## 2023-11-02 PROCEDURE — 70551 MRI BRAIN STEM W/O DYE: CPT | Mod: GC | Performed by: RADIOLOGY

## 2023-11-03 ENCOUNTER — MYC MEDICAL ADVICE (OUTPATIENT)
Dept: FAMILY MEDICINE | Facility: CLINIC | Age: 48
End: 2023-11-03
Payer: COMMERCIAL

## 2023-11-03 DIAGNOSIS — R51.9 NEW ONSET OF HEADACHES: Primary | ICD-10-CM

## 2023-11-08 ENCOUNTER — LAB REQUISITION (OUTPATIENT)
Dept: LAB | Facility: CLINIC | Age: 48
End: 2023-11-08

## 2023-11-08 LAB — SARS-COV-2 RNA RESP QL NAA+PROBE: NEGATIVE

## 2023-11-08 PROCEDURE — 87635 SARS-COV-2 COVID-19 AMP PRB: CPT | Performed by: INTERNAL MEDICINE

## 2023-12-01 ENCOUNTER — TELEPHONE (OUTPATIENT)
Dept: NEUROLOGY | Facility: CLINIC | Age: 48
End: 2023-12-01
Payer: COMMERCIAL

## 2024-01-10 ASSESSMENT — HEADACHE IMPACT TEST (HIT 6)
HOW OFTEN HAVE YOU FELT FED UP OR IRRITATED BECAUSE OF YOUR HEADACHES: RARELY
HOW OFTEN DID HEADACHS LIMIT CONCENTRATION ON WORK OR DAILY ACTIVITY: RARELY
WHEN YOU HAVE HEADACHES HOW OFTEN IS THE PAIN SEVERE: RARELY
HOW OFTEN DO HEADACHES LIMIT YOUR DAILY ACTIVITIES: RARELY
HOW OFTEN HAVE YOU FELT TOO TIRED TO WORK BECAUSE OF YOUR HEADACHES: NEVER
HIT6 TOTAL SCORE: 44
WHEN YOU HAVE A HEADACHE HOW OFTEN DO YOU WISH YOU COULD LIE DOWN: NEVER

## 2024-01-10 ASSESSMENT — MIGRAINE DISABILITY ASSESSMENT (MIDAS)
HOW MANY DAYS IN THE PAST 3 MONTHS HAVE YOU HAD A HEADACHE: 5
HOW MANY DAYS DID YOU NOT DO HOUSEWORK BECAUSE OF HEADACHES: 0
HOW MANY DAYS DID YOU MISS WORK OR SCHOOL BECAUSE OF HEADACHES: 0
TOTAL SCORE: 2
HOW OFTEN WERE SOCIAL ACTIVITIES MISSED DUE TO HEADACHES: 2
HOW MANY DAYS WAS HOUSEWORK PRODUCTIVITY CUT IN HALF DUE TO HEADACHES: 0
ON A SCALE FROM 0-10 ON AVERAGE HOW PAINFUL WERE HEADACHES: 7
HOW MANY DAYS WAS YOUR PRODUCTIVITY CUT IN HALF BECAUSE OF HEADACHES: 0

## 2024-01-11 ENCOUNTER — OFFICE VISIT (OUTPATIENT)
Dept: NEUROLOGY | Facility: CLINIC | Age: 49
End: 2024-01-11
Attending: FAMILY MEDICINE
Payer: COMMERCIAL

## 2024-01-11 ENCOUNTER — MYC MEDICAL ADVICE (OUTPATIENT)
Dept: FAMILY MEDICINE | Facility: CLINIC | Age: 49
End: 2024-01-11

## 2024-01-11 ENCOUNTER — LAB (OUTPATIENT)
Dept: LAB | Facility: CLINIC | Age: 49
End: 2024-01-11
Payer: COMMERCIAL

## 2024-01-11 VITALS
OXYGEN SATURATION: 100 % | BODY MASS INDEX: 21.49 KG/M2 | DIASTOLIC BLOOD PRESSURE: 73 MMHG | SYSTOLIC BLOOD PRESSURE: 121 MMHG | HEART RATE: 73 BPM | WEIGHT: 102.8 LBS

## 2024-01-11 DIAGNOSIS — R73.03 PREDIABETES: ICD-10-CM

## 2024-01-11 DIAGNOSIS — R26.89 IMBALANCE: ICD-10-CM

## 2024-01-11 DIAGNOSIS — G44.84 EXERTIONAL HEADACHE: ICD-10-CM

## 2024-01-11 DIAGNOSIS — T83.89XA HEAVY MENSES DUE TO IUD (H): ICD-10-CM

## 2024-01-11 DIAGNOSIS — R73.03 PREDIABETES: Primary | ICD-10-CM

## 2024-01-11 DIAGNOSIS — R51.9 NEW ONSET OF HEADACHES: ICD-10-CM

## 2024-01-11 DIAGNOSIS — R51.9 OCCIPITAL HEADACHE: ICD-10-CM

## 2024-01-11 DIAGNOSIS — R51.9 NEW ONSET OF HEADACHES: Primary | ICD-10-CM

## 2024-01-11 DIAGNOSIS — N92.0 HEAVY MENSES DUE TO IUD (H): ICD-10-CM

## 2024-01-11 LAB
FERRITIN SERPL-MCNC: 25 NG/ML (ref 6–175)
HGB BLD-MCNC: 13.2 G/DL (ref 11.7–15.7)
VIT B12 SERPL-MCNC: 330 PG/ML (ref 232–1245)

## 2024-01-11 PROCEDURE — 36415 COLL VENOUS BLD VENIPUNCTURE: CPT

## 2024-01-11 PROCEDURE — 86140 C-REACTIVE PROTEIN: CPT

## 2024-01-11 PROCEDURE — 82607 VITAMIN B-12: CPT

## 2024-01-11 PROCEDURE — 85018 HEMOGLOBIN: CPT

## 2024-01-11 PROCEDURE — 99204 OFFICE O/P NEW MOD 45 MIN: CPT

## 2024-01-11 PROCEDURE — 83036 HEMOGLOBIN GLYCOSYLATED A1C: CPT

## 2024-01-11 PROCEDURE — 82728 ASSAY OF FERRITIN: CPT

## 2024-01-11 RX ORDER — ESCITALOPRAM OXALATE 20 MG/1
20 TABLET ORAL DAILY
COMMUNITY
Start: 2023-12-25 | End: 2024-03-26

## 2024-01-11 ASSESSMENT — PAIN SCALES - GENERAL: PAINLEVEL: NO PAIN (0)

## 2024-01-11 NOTE — NURSING NOTE
"Yolette Hayward is a 48 year old female who presents for:  Chief Complaint   Patient presents with    Headache     headaches Ref Germania Osborne MD in [facility] MED- ref / records in [facility] with pt   offered/ r/s to 12-6-23 @ 1:30p           Initial Vitals:  /73   Pulse 73   Wt 46.6 kg (102 lb 12.8 oz)   SpO2 100%   BMI 21.49 kg/m   Estimated body mass index is 21.49 kg/m  as calculated from the following:    Height as of 10/31/23: 1.473 m (4' 10\").    Weight as of this encounter: 46.6 kg (102 lb 12.8 oz).. Body surface area is 1.38 meters squared. BP completed using cuff size: regular  No Pain (0)    Nursing Comments:     Veronica Cleaning MA   "

## 2024-01-11 NOTE — LETTER
1/11/2024         RE: Yolette Hayward  [address]  [facility] [address]        Dear Colleague,    Thank you for referring your patient, Yolette Hayward, to the [facility] NEUROLOGY CLINICS [facility]. Please see a copy of my visit note below.    [facility]   Headache Neurology Consult    January 11, 2024     Yolette Hayward MRN# [id number]   YOB: 1975 Age: 48 year old     Referring provider: Germania Osborne          Assessment and Recommendations:     Yolette Hayward is a 48 year old female who presents for further evaluation of headache.    She reports new occipital headaches that started 3 months ago and were brought on within minutes of high intensity exercise. She had an MRI brain completed per her primary care provider which demonstrated a single T2 hyperintensity within the right posterior inferior frontal gyrus which raised question of possible vasculopathy.  She has recently had a CRP level which was normal.  We discussed that overall, it is reassuring that her exercise-induced headaches seem to have resolved with supportive measures.  Discussed that usually for exertional headache, I would recommend vascular imaging or would consider cardiac evaluation, though given her resolution in headaches I do not think this is warranted at this time.  It is possible that her headaches were due to occipital muscle spasm or occipital neuralgia.    She also mentions some worse balance over the past year.  She does eat a primarily plant-based diet so will check B12 level.  Would recommend B12 supplementation if level is below 400 with follow up level checked after 2-3 months.     Advise she continue to monitor her headaches.  Should she experience recurrence of severe headaches or new neurologic symptoms, encouraged her to reach out to clinic and additional workup may be considered if appropriate.    Yolette had no further questions or concerns today.  She may follow-up in  the headache clinic as needed.    Sonia Sanchez PA-C  Headache Neurology  [facility] Neurology [place]            Chief Complaint:     Chief Complaint   Patient presents with     Headache     headaches Ref Germania Osborne MD in [facility]- ref / records in [facility] with pt   offered/ r/s to 12-6-23 @ 1:30p              History is obtained from the patient and medical record.      Yolette Hayward is a 48 year old female who presents for further evaluation of headaches.     She reports new headaches associated with exercise that started about 3 months ago.  Her headaches would come on quickly (within minutes) of starting HIIT exercise. Headache would be a sharp pain across the occiput bilaterally. She denies pain radiating from the neck or chest. Headache would last a few minutes and could get up to a 7-8/10 for intensity. She would slow her workout down and pain would subside, and she could complete her workout without worsening or progression of symptoms. Occasionally she would have instances where she may have felt nauseous or generally unwell after completing a workout and would maybe have a residual dull occipital headache.     She started foam rolling her neck prior to exercise and being more careful with warming up and hydrating and this seems to have prevented recurrence of headaches. She last experienced an exercise-induced headache about 6 weeks ago and has maintained her usual exercise regimen.    She possibly had some brief mild dizziness or lightheadedness accompanying the headaches, but otherwise denies focal paresthesias or weakness, visual disturbances.     She does occasionally get mild dull headaches possibly related to work stress, eye strain or fluorescent lighting, or dehydration. These are very mild, located frontally. She will use ibuprofen if needed and this is generally helpful. She is not sure if these headaches are more frequent around her menstrual cycle.     Over the past  year, she feels as though her balance is not as good. She may need to catch herself more frequently in yoga.     She has a history of right AC joint injuries with some right-sided muscle tension, but otherwise denies neck or spine problems.     She reports she has a history of Raynaud's.     Her mother has a history of headaches.     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             Past Medical History:   No past medical history on file.          Past Surgical History:     Past Surgical History:   Procedure Laterality Date     NO HISTORY OF SURGERY             Social History:     Social History     Socioeconomic History     Marital status: Single     Spouse name: Not on file     Number of children: Not on file     Years of education: Not on file     Highest education level: Not on file   Occupational History     Occupation: Lab     Employer: [employer]   Tobacco Use     Smoking status: Never     Smokeless tobacco: Never   Vaping Use     Vaping Use: Never used   Substance and Sexual Activity     Alcohol use: Yes     Drug use: Not Currently     Sexual activity: Yes     Partners: Male     Birth control/protection: I.U.D., Male Surgical     Comment: Paragard   Other Topics Concern     Not on file   Social History Narrative     Not on file     Social Determinants of Health     Financial Resource Strain: Low Risk  (10/31/2023)    Financial Resource Strain      Within the past 12 months, have you or your family members you live with been unable to get utilities (heat, electricity) when it was really needed?: No   Food Insecurity: Low Risk  (10/31/2023)    Food Insecurity      Within the past 12 months, did you worry that your food would run out before you got money to buy more?: No      Within the past 12 months, did the food you bought just not last and you didn t have money to get more?: No   Transportation Needs: Low Risk  (10/31/2023)    Transportation Needs      Within the past 12 months,  has lack of transportation kept you from medical appointments, getting your medicines, non-medical meetings or appointments, work, or from getting things that you need?: No   Physical Activity: Not on file   Stress: Not on file   Social Connections: Not on file   Interpersonal Safety: Not on file   Housing Stability: Low Risk  (10/31/2023)    Housing Stability      Do you have housing? : Yes      Are you worried about losing your housing?: No             Family History:     Family History   Problem Relation Age of Onset     Osteoporosis Mother      Skin Cancer Mother         Basal Cell Carcinoma     Hypertension Father              Allergies:    No Known Allergies          Medications:     Current Outpatient Medications:      escitalopram (LEXAPRO) 20 MG tablet, Take 20 mg by mouth daily, Disp: , Rfl:      Omega-3 Fatty Acids (FISH OIL PO), , Disp: , Rfl:      paragard intrauterine copper device, 1 each by Intrauterine route once, Disp: , Rfl:      Turmeric (QC TUMERIC COMPLEX PO), , Disp: , Rfl:      VITAMIN D PO, , Disp: , Rfl:           Physical Exam:   /73   Pulse 73   Wt 46.6 kg (102 lb 12.8 oz)   SpO2 100%   BMI 21.49 kg/m       General: In no acute distress.  Head: Normocephalic, atraumatic. No radiating pain with palpation over the supraorbital notches, occipital nerves. Temporal pulses intact.   Neck: Normal range of motion with lateral head movements and neck flexion.  Eyes: No conjunctival injection, no scleral icterus.     Neurologic Exam:  Mental Status Exam: Alert, awake and oriented to situation. No dysarthria. Speech of normal fluency.  Cranial Nerves: Fundoscopic exam with clear disc margins bilaterally. PERRLA, EOMs intact, no nystagmus, facial movements symmetric, facial sensation intact to light touch, hearing intact to conversation, trapezius and SCMs 5/5 bilaterally, tongue midline and fully mobile. No tongue atrophy or fasciculations.   Motor: Normal tone in all four extremities, no  atrophy or fasciculations. 5/5 strength bilaterally in shoulder abduction, elbow flexion and extension, wrist flexion and extension, hip flexion, knee flexion and extension, dorsiflexion and plantarflexion. No tremors or abnormal movements noted.  Sensory: Sensation intact to light touch on arms and legs bilaterally.   Coordination: Finger-nose-finger intact bilaterally. Rapidly alternating movements intact bilaterally in the upper extremities. Normal finger tapping bilaterally. Normal Romberg.  Reflexes: 2+ and symmetric in triceps, biceps, brachioradialis, patellar, and Achilles. Plantar reflexes are downgoing bilaterally.  Gait: Normal gait. Able to toe and heel walk. Normal tandem gait.            Data:     Brain MRI without contrast 11/2/2023     History: New exercise-induced headache; Headache; No applicable  indication; New onset of headaches.     Comparison: none     Technique: Sagittal T1-weighted, axial diffusion, FLAIR, T2-weighted,  and susceptibility images of the brain were obtained without  intravenous contrast.     Findings:   There is no evidence of intra or extra-axial mass on these noncontrast  images. There is no evidence of intracranial hemorrhage. Axial  diffusion-weighted images are unremarkable. The gray-white matter  differentiation appears within normal limits. The ventricles are  normal in size for age. Single focus of T2 hyperintense signal within  the prefrontal gyrus of the right frontal lobe.     The major intracranial vascular flow-voids are present. The visualized  portions of the paranasal sinuses and mastoid air cells are  unremarkable.                                                                      Impression:  1. No acute intracranial pathology.  2. Nonspecific focus of T2 hyperintensity within the right posterior  inferior frontal gyrus, could be sequela of infectious/inflammatory  process or vasculopathy.      9/20/2023  C-Reactive Protein High Sensitivity 2.98          Again, thank you for allowing me to participate in the care of your patient.        Sincerely,        CIRSTIANE MILES PA-C

## 2024-01-11 NOTE — PROGRESS NOTES
[facility]   Headache Neurology Consult    January 11, 2024     Yolette Hayward MRN# [id number]   YOB: 1975 Age: 48 year old     Referring provider: Germania Osborne          Assessment and Recommendations:     Yolette Hayward is a 48 year old female who presents for further evaluation of headache.    She reports new occipital headaches that started 3 months ago and were brought on within minutes of high intensity exercise. She had an MRI brain completed per her primary care provider which demonstrated a single T2 hyperintensity within the right posterior inferior frontal gyrus which raised question of possible vasculopathy.  She has recently had a CRP level which was normal.  We discussed that overall, it is reassuring that her exercise-induced headaches seem to have resolved with supportive measures.  Discussed that usually for exertional headache, I would recommend vascular imaging or would consider cardiac evaluation, though given her resolution in headaches I do not think this is warranted at this time.  It is possible that her headaches were due to occipital muscle spasm or occipital neuralgia.    She also mentions some worse balance over the past year.  She does eat a primarily plant-based diet so will check B12 level.  Would recommend B12 supplementation if level is below 400 with follow up level checked after 2-3 months.     Advise she continue to monitor her headaches.  Should she experience recurrence of severe headaches or new neurologic symptoms, encouraged her to reach out to clinic and additional workup may be considered if appropriate.    Yolette had no further questions or concerns today.  She may follow-up in the headache clinic as needed.    I spent 54 minutes today on patient care, chart review, documentation.    Sonia Sanchez PA-C  Headache Neurology  [facility] - [place]            Chief Complaint:     Chief Complaint   Patient presents with     Headache     headaches Ref Germania Osborne MD in [facility]- ref / records in [facility] with pt   offered/ r/s to 12-6-23 @ 1:30p              History is obtained from the patient and medical record.      Yolette Hayward is a 48 year old female who presents for further evaluation of headaches.     She reports new headaches associated with exercise that started about 3 months ago.  Her headaches would come on quickly (within minutes) of starting HIIT exercise. Headache would be a sharp pain across the occiput bilaterally. She denies pain radiating from the neck or chest. Headache would last a few minutes and could get up to a 7-8/10 for intensity. She would slow her workout down and pain would subside, and she could complete her workout without worsening or progression of symptoms. Occasionally she would have instances where she may have felt nauseous or generally unwell after completing a workout and would maybe have a residual dull occipital headache.     She started foam rolling her neck prior to exercise and being more careful with warming up and hydrating and this seems to have prevented recurrence of headaches. She last experienced an exercise-induced headache about 6 weeks ago and has maintained her usual exercise regimen.    She possibly had some brief mild dizziness or lightheadedness accompanying the headaches, but otherwise denies focal paresthesias or weakness, visual disturbances.     She does occasionally get mild dull headaches possibly related to work stress, eye strain or fluorescent lighting, or dehydration. These are very mild, located frontally. She will use ibuprofen if needed and this is generally helpful. She is not sure if these headaches are more frequent around her menstrual cycle.     Over the past year, she feels as though her balance is not as good. She may need to catch herself more frequently in yoga.     She has a history of right AC joint injuries with some right-sided muscle  tension, but otherwise denies neck or spine problems.     She reports she has a history of Raynaud's.     Her mother has a history of headaches.     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             Past Medical History:   No past medical history on file.          Past Surgical History:     Past Surgical History:   Procedure Laterality Date    NO HISTORY OF SURGERY             Social History:     Social History     Socioeconomic History    Marital status: Single     Spouse name: Not on file    Number of children: Not on file    Years of education: Not on file    Highest education level: Not on file   Occupational History    Occupation: Lab     Employer: [employer]   Tobacco Use    Smoking status: Never    Smokeless tobacco: Never   Vaping Use    Vaping Use: Never used   Substance and Sexual Activity    Alcohol use: Yes    Drug use: Not Currently    Sexual activity: Yes     Partners: Male     Birth control/protection: I.U.D., Male Surgical     Comment: Paragard   Other Topics Concern    Not on file   Social History Narrative    Not on file     Social Determinants of Health     Financial Resource Strain: Low Risk  (10/31/2023)    Financial Resource Strain     Within the past 12 months, have you or your family members you live with been unable to get utilities (heat, electricity) when it was really needed?: No   Food Insecurity: Low Risk  (10/31/2023)    Food Insecurity     Within the past 12 months, did you worry that your food would run out before you got money to buy more?: No     Within the past 12 months, did the food you bought just not last and you didn t have money to get more?: No   Transportation Needs: Low Risk  (10/31/2023)    Transportation Needs     Within the past 12 months, has lack of transportation kept you from medical appointments, getting your medicines, non-medical meetings or appointments, work, or from getting things that you need?: No   Physical Activity: Not on file    Stress: Not on file   Social Connections: Not on file   Interpersonal Safety: Not on file   Housing Stability: Low Risk  (10/31/2023)    Housing Stability     Do you have housing? : Yes     Are you worried about losing your housing?: No             Family History:     Family History   Problem Relation Age of Onset    Osteoporosis Mother     Skin Cancer Mother         Basal Cell Carcinoma    Hypertension Father              Allergies:    No Known Allergies          Medications:     Current Outpatient Medications:     escitalopram (LEXAPRO) 20 MG tablet, Take 20 mg by mouth daily, Disp: , Rfl:     Omega-3 Fatty Acids (FISH OIL PO), , Disp: , Rfl:     paragard intrauterine copper device, 1 each by Intrauterine route once, Disp: , Rfl:     Turmeric (QC TUMERIC COMPLEX PO), , Disp: , Rfl:     VITAMIN D PO, , Disp: , Rfl:           Physical Exam:   /73   Pulse 73   Wt 46.6 kg (102 lb 12.8 oz)   SpO2 100%   BMI 21.49 kg/m       General: In no acute distress.  Head: Normocephalic, atraumatic. No radiating pain with palpation over the supraorbital notches, occipital nerves. Temporal pulses intact.   Neck: Normal range of motion with lateral head movements and neck flexion.  Eyes: No conjunctival injection, no scleral icterus.     Neurologic Exam:  Mental Status Exam: Alert, awake and oriented to situation. No dysarthria. Speech of normal fluency.  Cranial Nerves: Fundoscopic exam with clear disc margins bilaterally. PERRLA, EOMs intact, no nystagmus, facial movements symmetric, facial sensation intact to light touch, hearing intact to conversation, trapezius and SCMs 5/5 bilaterally, tongue midline and fully mobile. No tongue atrophy or fasciculations.   Motor: Normal tone in all four extremities, no atrophy or fasciculations. 5/5 strength bilaterally in shoulder abduction, elbow flexion and extension, wrist flexion and extension, hip flexion, knee flexion and extension, dorsiflexion and plantarflexion. No  tremors or abnormal movements noted.  Sensory: Sensation intact to light touch on arms and legs bilaterally.   Coordination: Finger-nose-finger intact bilaterally. Rapidly alternating movements intact bilaterally in the upper extremities. Normal finger tapping bilaterally. Normal Romberg.  Reflexes: 2+ and symmetric in triceps, biceps, brachioradialis, patellar, and Achilles. Plantar reflexes are downgoing bilaterally.  Gait: Normal gait. Able to toe and heel walk. Normal tandem gait.            Data:     Brain MRI without contrast 11/2/2023     History: New exercise-induced headache; Headache; No applicable  indication; New onset of headaches.     Comparison: none     Technique: Sagittal T1-weighted, axial diffusion, FLAIR, T2-weighted,  and susceptibility images of the brain were obtained without  intravenous contrast.     Findings:   There is no evidence of intra or extra-axial mass on these noncontrast  images. There is no evidence of intracranial hemorrhage. Axial  diffusion-weighted images are unremarkable. The gray-white matter  differentiation appears within normal limits. The ventricles are  normal in size for age. Single focus of T2 hyperintense signal within  the prefrontal gyrus of the right frontal lobe.     The major intracranial vascular flow-voids are present. The visualized  portions of the paranasal sinuses and mastoid air cells are  unremarkable.                                                                      Impression:  1. No acute intracranial pathology.  2. Nonspecific focus of T2 hyperintensity within the right posterior  inferior frontal gyrus, could be sequela of infectious/inflammatory  process or vasculopathy.      9/20/2023  C-Reactive Protein High Sensitivity 2.98

## 2024-01-12 LAB
CRP SERPL-MCNC: <3 MG/L
HBA1C MFR BLD: 5.2 % (ref 0–5.6)

## 2024-01-17 PROBLEM — R73.03 PREDIABETES: Status: RESOLVED | Noted: 2023-10-31 | Resolved: 2024-01-17

## 2024-02-27 ENCOUNTER — LAB REQUISITION (OUTPATIENT)
Dept: LAB | Facility: CLINIC | Age: 49
End: 2024-02-27

## 2024-02-27 LAB — SARS-COV-2 RNA RESP QL NAA+PROBE: NEGATIVE

## 2024-02-27 PROCEDURE — 87635 SARS-COV-2 COVID-19 AMP PRB: CPT | Performed by: INTERNAL MEDICINE

## 2024-03-22 RX ORDER — LANOLIN ALCOHOL/MO/W.PET/CERES
1000 CREAM (GRAM) TOPICAL DAILY
COMMUNITY
Start: 2024-01-15

## 2024-03-25 SDOH — HEALTH STABILITY: PHYSICAL HEALTH: ON AVERAGE, HOW MANY MINUTES DO YOU ENGAGE IN EXERCISE AT THIS LEVEL?: 50 MIN

## 2024-03-25 SDOH — HEALTH STABILITY: PHYSICAL HEALTH: ON AVERAGE, HOW MANY DAYS PER WEEK DO YOU ENGAGE IN MODERATE TO STRENUOUS EXERCISE (LIKE A BRISK WALK)?: 4 DAYS

## 2024-03-25 ASSESSMENT — SOCIAL DETERMINANTS OF HEALTH (SDOH): HOW OFTEN DO YOU GET TOGETHER WITH FRIENDS OR RELATIVES?: TWICE A WEEK

## 2024-03-26 ENCOUNTER — LAB (OUTPATIENT)
Dept: LAB | Facility: CLINIC | Age: 49
End: 2024-03-26
Attending: FAMILY MEDICINE
Payer: COMMERCIAL

## 2024-03-26 ENCOUNTER — OFFICE VISIT (OUTPATIENT)
Dept: FAMILY MEDICINE | Facility: CLINIC | Age: 49
End: 2024-03-26
Attending: FAMILY MEDICINE
Payer: COMMERCIAL

## 2024-03-26 VITALS
WEIGHT: 100 LBS | HEART RATE: 72 BPM | HEIGHT: 58 IN | SYSTOLIC BLOOD PRESSURE: 95 MMHG | DIASTOLIC BLOOD PRESSURE: 63 MMHG | BODY MASS INDEX: 20.99 KG/M2

## 2024-03-26 DIAGNOSIS — Z78.9 TAKES DIETARY SUPPLEMENTS: ICD-10-CM

## 2024-03-26 DIAGNOSIS — Z86.19 H/O HERPETIC WHITLOW: ICD-10-CM

## 2024-03-26 DIAGNOSIS — R26.89 IMBALANCE: ICD-10-CM

## 2024-03-26 DIAGNOSIS — R51.9 NEW ONSET OF HEADACHES: ICD-10-CM

## 2024-03-26 DIAGNOSIS — F33.0 MAJOR DEPRESSIVE DISORDER, RECURRENT EPISODE, MILD WITH ANXIOUS DISTRESS (H): ICD-10-CM

## 2024-03-26 DIAGNOSIS — Z00.00 ROUTINE GENERAL MEDICAL EXAMINATION AT A HEALTH CARE FACILITY: Primary | ICD-10-CM

## 2024-03-26 DIAGNOSIS — Z78.9 VEGAN DIET: ICD-10-CM

## 2024-03-26 DIAGNOSIS — H91.91 DECREASED HEARING OF RIGHT EAR: ICD-10-CM

## 2024-03-26 DIAGNOSIS — Z12.31 ENCOUNTER FOR SCREENING MAMMOGRAM FOR MALIGNANT NEOPLASM OF BREAST: ICD-10-CM

## 2024-03-26 LAB
ERYTHROCYTE [DISTWIDTH] IN BLOOD BY AUTOMATED COUNT: 12.4 % (ref 10–15)
FERRITIN SERPL-MCNC: 13 NG/ML (ref 6–175)
HCT VFR BLD AUTO: 39 % (ref 35–47)
HGB BLD-MCNC: 12.7 G/DL (ref 11.7–15.7)
MCH RBC QN AUTO: 30.5 PG (ref 26.5–33)
MCHC RBC AUTO-ENTMCNC: 32.6 G/DL (ref 31.5–36.5)
MCV RBC AUTO: 94 FL (ref 78–100)
PLATELET # BLD AUTO: 257 10E3/UL (ref 150–450)
RBC # BLD AUTO: 4.17 10E6/UL (ref 3.8–5.2)
VIT B12 SERPL-MCNC: 606 PG/ML (ref 232–1245)
WBC # BLD AUTO: 7.6 10E3/UL (ref 4–11)

## 2024-03-26 PROCEDURE — 99396 PREV VISIT EST AGE 40-64: CPT | Performed by: FAMILY MEDICINE

## 2024-03-26 PROCEDURE — 82306 VITAMIN D 25 HYDROXY: CPT

## 2024-03-26 PROCEDURE — 85027 COMPLETE CBC AUTOMATED: CPT

## 2024-03-26 PROCEDURE — 82728 ASSAY OF FERRITIN: CPT

## 2024-03-26 PROCEDURE — 82607 VITAMIN B-12: CPT

## 2024-03-26 PROCEDURE — 36415 COLL VENOUS BLD VENIPUNCTURE: CPT

## 2024-03-26 PROCEDURE — 99213 OFFICE O/P EST LOW 20 MIN: CPT | Performed by: FAMILY MEDICINE

## 2024-03-26 PROCEDURE — 99213 OFFICE O/P EST LOW 20 MIN: CPT | Mod: 25 | Performed by: FAMILY MEDICINE

## 2024-03-26 RX ORDER — ACYCLOVIR 50 MG/G
OINTMENT TOPICAL
Qty: 15 G | Refills: 3 | Status: SHIPPED | OUTPATIENT
Start: 2024-03-26

## 2024-03-26 RX ORDER — ESCITALOPRAM OXALATE 20 MG/1
20 TABLET ORAL DAILY
Qty: 90 TABLET | Refills: 4 | Status: SHIPPED | OUTPATIENT
Start: 2024-03-26 | End: 2024-03-26

## 2024-03-26 RX ORDER — ESCITALOPRAM OXALATE 20 MG/1
20 TABLET ORAL DAILY
Qty: 90 TABLET | Refills: 4 | Status: SHIPPED | OUTPATIENT
Start: 2024-03-26

## 2024-03-26 NOTE — PATIENT INSTRUCTIONS
Preventive Care Advice   This is general advice given by our system to help you stay healthy. However, your care team may have specific advice just for you. Please talk to your care team about your preventive care needs.  Nutrition  Eat 5 or more servings of fruits and vegetables each day.  Try wheat bread, brown rice and whole grain pasta (instead of white bread, rice, and pasta).  Get enough calcium and vitamin D. Check the label on foods and aim for 100% of the RDA (recommended daily allowance).  Lifestyle  Exercise at least 150 minutes each week   (30 minutes a day, 5 days a week).  Do muscle strengthening activities 2 days a week. These help control your weight and prevent disease.  No smoking.  Wear sunscreen to prevent skin cancer.  Have a dental exam and cleaning every 6 months.  Yearly exams  See your health care team every year to talk about:  Any changes in your health.  Any medicines your care team has prescribed.  Preventive care, family planning, and ways to prevent chronic diseases.  Shots (vaccines)   HPV shots (up to age 26), if you've never had them before.  Hepatitis B shots (up to age 59), if you've never had them before.  COVID-19 shot: Get this shot when it's due.  Flu shot: Get a flu shot every year.  Tetanus shot: Get a tetanus shot every 10 years.  Pneumococcal, hepatitis A, and RSV shots: Ask your care team if you need these based on your risk.  Shingles shot (for age 50 and up).  General health tests  Diabetes screening:  Starting at age 35, Get screened for diabetes at least every 3 years.  If you are younger than age 35, ask your care team if you should be screened for diabetes.  Cholesterol test: At age 39, start having a cholesterol test every 5 years, or more often if advised.  Bone density scan (DEXA): At age 50, ask your care team if you should have this scan for osteoporosis (brittle bones).  Hepatitis C: Get tested at least once in your life.  STIs (sexually transmitted  infections)  Before age 24: Ask your care team if you should be screened for STIs.  After age 24: Get screened for STIs if you're at risk. You are at risk for STIs (including HIV) if:  You are sexually active with more than one person.  You don't use condoms every time.  You or a partner was diagnosed with a sexually transmitted infection.  If you are at risk for HIV, ask about PrEP medicine to prevent HIV.  Get tested for HIV at least once in your life, whether you are at risk for HIV or not.  Cancer screening tests  Cervical cancer screening: If you have a cervix, begin getting regular cervical cancer screening tests at age 21. Most people who have regular screenings with normal results can stop after age 65. Talk about this with your provider.  Breast cancer scan (mammogram): If you've ever had breasts, begin having regular mammograms starting at age 40. This is a scan to check for breast cancer.  Colon cancer screening: It is important to start screening for colon cancer at age 45.  Have a colonoscopy test every 10 years (or more often if you're at risk) Or, ask your provider about stool tests like a FIT test every year or Cologuard test every 3 years.  To learn more about your testing options, visit: https://www.[employer]/721479.pdf.  For help making a decision, visit: https://bit.ly/vg69366.  Prostate cancer screening test: If you have a prostate and are age 55 to 69, ask your provider if you would benefit from a yearly prostate cancer screening test.  Lung cancer screening: If you are a current or former smoker age 50 to 80, ask your care team if ongoing lung cancer screenings are right for you.  For informational purposes only. Not to replace the advice of your health care provider. Copyright   2023 [employer]. All rights reserved. Clinically reviewed by the [facility]. [employer] 448356 - REV 01/24.    Learning About Stress  What is stress?     Stress is your  body's response to a hard situation. Your body can have a physical, emotional, or mental response. Stress is a fact of life for most people, and it affects everyone differently. What causes stress for you may not be stressful for someone else.  A lot of things can cause stress. You may feel stress when you go on a job interview, take a test, or run a race. This kind of short-term stress is normal and even useful. It can help you if you need to work hard or react quickly. For example, stress can help you finish an important job on time.  Long-term stress is caused by ongoing stressful situations or events. Examples of long-term stress include long-term health problems, ongoing problems at work, or conflicts in your family. Long-term stress can harm your health.  How does stress affect your health?  When you are stressed, your body responds as though you are in danger. It makes hormones that speed up your heart, make you breathe faster, and give you a burst of energy. This is called the fight-or-flight stress response. If the stress is over quickly, your body goes back to normal and no harm is done.  But if stress happens too often or lasts too long, it can have bad effects. Long-term stress can make you more likely to get sick, and it can make symptoms of some diseases worse. If you tense up when you are stressed, you may develop neck, shoulder, or low back pain. Stress is linked to high blood pressure and heart disease.  Stress also harms your emotional health. It can make you smith, tense, or depressed. Your relationships may suffer, and you may not do well at work or school.  What can you do to manage stress?  You can try these things to help manage stress:   Do something active. Exercise or activity can help reduce stress. Walking is a great way to get started. Even everyday activities such as housecleaning or yard work can help.  Try yoga or hattie chi. These techniques combine exercise and meditation. You may need  some training at first to learn them.  Do something you enjoy. For example, listen to music or go to a movie. Practice your hobby or do volunteer work.  Meditate. This can help you relax, because you are not worrying about what happened before or what may happen in the future.  Do guided imagery. Imagine yourself in any setting that helps you feel calm. You can use online videos, books, or a teacher to guide you.  Do breathing exercises. For example:  From a standing position, bend forward from the waist with your knees slightly bent. Let your arms dangle close to the floor.  Breathe in slowly and deeply as you return to a standing position. Roll up slowly and lift your head last.  Hold your breath for just a few seconds in the standing position.  Breathe out slowly and bend forward from the waist.  Let your feelings out. Talk, laugh, cry, and express anger when you need to. Talking with supportive friends or family, a counselor, or a monique leader about your feelings is a healthy way to relieve stress. Avoid discussing your feelings with people who make you feel worse.  Write. It may help to write about things that are bothering you. This helps you find out how much stress you feel and what is causing it. When you know this, you can find better ways to cope.  What can you do to prevent stress?  You might try some of these things to help prevent stress:  Manage your time. This helps you find time to do the things you want and need to do.  Get enough sleep. Your body recovers from the stresses of the day while you are sleeping.  Get support. Your family, friends, and community can make a difference in how you experience stress.  Limit your news feed. Avoid or limit time on social media or news that may make you feel stressed.  Do something active. Exercise or activity can help reduce stress. Walking is a great way to get started.  Where can you learn more?  Go to https://www.healthwise.net/patiented  Enter N032 in the  "search box to learn more about \"Learning About Stress.\"  Current as of: October 24, 2023               Content Version: 14.0    5307-0628 [employer].   Care instructions adapted under license by your healthcare professional. If you have questions about a medical condition or this instruction, always ask your healthcare professional. [employer] disclaims any warranty or liability for your use of this information.      "

## 2024-03-26 NOTE — PROGRESS NOTES
Preventive Care Visit  [facility]  Germania Osborne MD, Family Medicine  Mar 26, 2024      Assessment & Plan     Routine general medical examination at a health care facility  She will check her records to see when she's due for pap smear.   Mammogram ordered.     Major depressive disorder, recurrent episode, mild with anxious distress (H24)  The current medical regimen is effective;  continue present plan and medications.  - escitalopram (LEXAPRO) 20 MG tablet; Take 1 tablet (20 mg) by mouth daily    Vegan diet  She is a vegan, would like to make sure she is getting enough iron. Is also currently taking a B12 supplement.   - CBC with Platelets; Future  - Ferritin; Future    Takes dietary supplements  Taking vitamin D, does not think she has ever had levels checked. Will check to ensure she is supplementing appropriately.   - 25 Hydroxyvitamin D2 and D3; Future    H/O herpetic aman  Shows some pictures on her phone of erythematous vesicles around the fingernail. Will Rx Zovirax ointment to be used prn for symptoms.   - acyclovir (ZOVIRAX) 5 % external ointment; Apply topically 6 times daily    Decreased hearing of right ear  Referral to audiology for hearing test.   - Adult Audiology  Referral; Future      Counseling  Appropriate preventive services were discussed with this patient, including applicable screening as appropriate for fall prevention, nutrition, physical activity, Tobacco-use cessation, weight loss and cognition.  Checklist reviewing preventive services available has been given to the patient.  Reviewed patient's diet, addressing concerns and/or questions.       Return in about 53 weeks (around 4/1/2025) for Annual Wellness Visit.    Gregorio De La Vega is a 49 year old, presenting for the following:  Physical (annual)        3/26/2024    11:18 AM   Additional Questions   Roomed by [facility] Care Directive  Patient does not have a Health  Care Directive or Living Will: Discussed advance care planning with patient; information given to patient to review.    HPI  Vegan diet -- Wants to make sure she is getting enough iron. B12 level was low normal, now taking a supplement.   Takes a vitamin D supplement, not sure what her levels are like.     Depression -- Sees a therapist. Currently taking escitalopram 20 mg daily. Has been on it many years.     Recent partner (just broke up) -- Partner had history of HSV-2, on medication for suppression. But she has noticed on her fingers occasionally she will have small painful vesicles. Wondering if she has herpetic aman? Has pictures to show.     Hearing decreased -- Wears one earbud at work and can feel like she can't hear as well out of right ear when she does.     Has had Paragard since 2015.         3/25/2024   General Health   How would you rate your overall physical health? Good   Feel stress (tense, anxious, or unable to sleep) Rather much   (!) STRESS CONCERN      3/25/2024   Nutrition   Three or more servings of calcium each day? (!) I DON'T KNOW   Diet: Vegetarian/vegan   How many servings of fruit and vegetables per day? 4 or more   How many sweetened beverages each day? 0-1         3/25/2024   Exercise   Days per week of moderate/strenous exercise 4 days   Average minutes spent exercising at this level 50 min         3/25/2024   Social Factors   Frequency of gathering with friends or relatives Twice a week   Worry food won't last until get money to buy more No   Food not last or not have enough money for food? No   Do you have housing?  Yes   Are you worried about losing your housing? No   Lack of transportation? No   Unable to get utilities (heat,electricity)? No         3/25/2024   Dental   Dentist two times every year? Yes           3/25/2024   Substance Use   Alcohol more than 3/day or more than 7/wk No   Do you use any other substances recreationally? No     Social History     Tobacco Use     "Smoking status: Never    Smokeless tobacco: Never   Vaping Use    Vaping Use: Never used   Substance Use Topics    Alcohol use: Yes    Drug use: Not Currently           3/25/2024   Breast Cancer Screening   Family history of breast, colon, or ovarian cancer? Yes         3/25/2024   LAST FHS-7 RESULTS   1st degree relative breast or ovarian cancer No   Any relative bilateral breast cancer No   Any male have breast cancer No   Any ONE woman have BOTH breast AND ovarian cancer No   Any woman with breast cancer before 50yrs No   2 or more relatives with breast AND/OR ovarian cancer No   2 or more relatives with breast AND/OR bowel cancer No         3/25/2024   One time HIV Screening   Previous HIV test? Yes         3/25/2024   STI Screening   New sexual partner(s) since last STI/HIV test? No     History of abnormal Pap smear: NO - age 30-65 PAP every 5 years with negative HPV co-testing recommended       ASCVD Risk   The 10-year ASCVD risk score (Su FULTON, et al., 2019) is: 0.3%    Values used to calculate the score:      Age: 49 years      Sex: Female      Is Non- : No      Diabetic: No      Tobacco smoker: No      Systolic Blood Pressure: 95 mmHg      Is BP treated: No      HDL Cholesterol: 71 mg/dL      Total Cholesterol: 148 mg/dL        3/25/2024   Contraception/Family Planning   Questions about contraception or family planning No     Reviewed and updated as needed this visit by Provider   Tobacco   Meds  Problems  Med Hx  Surg Hx  Fam Hx               Objective    Exam  BP 95/63   Pulse 72   Ht 1.473 m (4' 10\")   Wt 45.4 kg (100 lb)   LMP 03/14/2024   BMI 20.90 kg/m     Estimated body mass index is 20.9 kg/m  as calculated from the following:    Height as of this encounter: 1.473 m (4' 10\").    Weight as of this encounter: 45.4 kg (100 lb).    Physical Exam  GENERAL: alert and no distress  EYES: Eyes grossly normal to inspection, PERRL and conjunctivae and sclerae " normal  HENT: ear canals and TM's normal, nose and mouth without ulcers or lesions  NECK: no adenopathy, no asymmetry, masses, or scars  RESP: lungs clear to auscultation - no rales, rhonchi or wheezes  CV: regular rate and rhythm, normal S1 S2, no S3 or S4, no murmur, click or rub, no peripheral edema  ABDOMEN: soft, nontender, without hepatosplenomegaly or masses  MS: no gross musculoskeletal defects noted, no edema  SKIN: no suspicious lesions or rashes  NEURO: Normal strength and tone, mentation intact and speech normal  PSYCH: mentation appears normal, affect normal/bright        Signed Electronically by: Germania Osborne MD

## 2024-03-30 LAB
DEPRECATED CALCIDIOL+CALCIFEROL SERPL-MC: <50 UG/L (ref 20–75)
VITAMIN D2 SERPL-MCNC: <5 UG/L
VITAMIN D3 SERPL-MCNC: 45 UG/L

## 2024-06-10 ENCOUNTER — OFFICE VISIT (OUTPATIENT)
Dept: DERMATOLOGY | Facility: CLINIC | Age: 49
End: 2024-06-10
Payer: COMMERCIAL

## 2024-06-10 DIAGNOSIS — L81.4 SOLAR LENTIGO: ICD-10-CM

## 2024-06-10 DIAGNOSIS — D22.9 MULTIPLE BENIGN NEVI: ICD-10-CM

## 2024-06-10 DIAGNOSIS — L57.8 PHOTOAGING OF SKIN: ICD-10-CM

## 2024-06-10 DIAGNOSIS — Z12.83 SKIN CANCER SCREENING: Primary | ICD-10-CM

## 2024-06-10 DIAGNOSIS — L82.1 SEBORRHEIC KERATOSES: ICD-10-CM

## 2024-06-10 DIAGNOSIS — D49.2 NEOPLASM OF UNSPECIFIED BEHAVIOR OF BONE, SOFT TISSUE, AND SKIN: ICD-10-CM

## 2024-06-10 DIAGNOSIS — D18.01 CHERRY ANGIOMA: ICD-10-CM

## 2024-06-10 PROCEDURE — 88305 TISSUE EXAM BY PATHOLOGIST: CPT | Mod: 26 | Performed by: PATHOLOGY

## 2024-06-10 PROCEDURE — 99203 OFFICE O/P NEW LOW 30 MIN: CPT | Mod: 25 | Performed by: PHYSICIAN ASSISTANT

## 2024-06-10 PROCEDURE — 88342 IMHCHEM/IMCYTCHM 1ST ANTB: CPT | Mod: 26 | Performed by: PATHOLOGY

## 2024-06-10 PROCEDURE — 11102 TANGNTL BX SKIN SINGLE LES: CPT | Performed by: PHYSICIAN ASSISTANT

## 2024-06-10 PROCEDURE — 88305 TISSUE EXAM BY PATHOLOGIST: CPT | Mod: TC | Performed by: PHYSICIAN ASSISTANT

## 2024-06-10 RX ORDER — TRETINOIN 0.25 MG/G
CREAM TOPICAL
Qty: 45 G | Refills: 3 | Status: SHIPPED | OUTPATIENT
Start: 2024-06-10

## 2024-06-10 RX ORDER — TRETINOIN 0.5 MG/G
CREAM TOPICAL AT BEDTIME
Status: CANCELLED | OUTPATIENT
Start: 2024-06-10

## 2024-06-10 ASSESSMENT — PAIN SCALES - GENERAL: PAINLEVEL: NO PAIN (0)

## 2024-06-10 NOTE — PATIENT INSTRUCTIONS
Topical Retinoids    What are topical retinoids?  These are medicines that are related to Vitamin A. They are used on the skin.  Retin-A , Renova , Differin , and Tazorac  are brand names.  Come in creams and clear gels  Used to treat skin conditions like pimples (acne), face wrinkling, or dark-colored sunspots    How do I use these medicines?  Wash face and let dry for 15 to 30 minutes.  Use a large pea-size amount of medicine to cover the whole face. Do not put on close to the eyes and lips.  Rub in gently.   Start by using every other day.  If you have no irritation after a few days, start to use it daily.    You might have too much irritation with daily use. Use it less often until the irritation goes away.  Then try to increase slowly to daily use.   Irritation improves over time.  You may use moisturizer if your skin becomes dry. Look for  non-comedogenic  (non-pore plugging) and oil free products.      What are the side effects?  Dryness   Peeling and flaking   Irritation of the skin   Possible increased chance of sunburns.  Protect your skin from sunlight. Wear a hat and use a sunscreen with SPF 30 or higher. Your sunscreen should have both UVA and UVB (broad-spectrum) protection.   Checking for Skin Cancer  You can help find cancer early by checking your skin each month. There are 3 main kinds of skin cancer: melanoma, basal cell carcinoma, and squamous cell carcinoma. Doing monthly skin checks is the best way to find new marks, sores, or skin changes. Follow these instructions for checking your skin.   The ABCDEs of checking moles for melanoma   Check your moles or growths for signs of melanoma using ABCDE:   Asymmetry: The sides of the mole or growth don t match.  Border: The edges are ragged, notched, or blurred.  Color: The color within the mole or growth varies. It could be black, brown, tan, white, or shades of red, gray, or blue.  Diameter: The mole or growth is larger than   inch or 6 mm (size of  a pencil eraser).  Evolving: The size, shape, texture, or color of the mole or growth is changing.     ABCDE's of moles on light skin.        ABCDE's of moles on dark skin may be harder to identify.     Checking for other types of skin cancer  Basal cell carcinoma or squamous cell carcinoma cause symptoms like:     A spot or mole that looks different from all other marks on your skin  Changes in how an area feels, such as itching, tenderness, or pain  Changes in the skin's surface, such as oozing, bleeding, or scaliness  A sore that doesn't heal  New swelling, redness, or spread of color beyond the border of a mole    Who s at risk?  Anyone of any skin color can get skin cancer. But you're at greater risk if you have:   Fair skin that freckles easily and burns instead of tanning  Light-colored or red hair  Light-colored eyes  Many moles or abnormal moles on your skin  A long history of unprotected exposure to sunlight or tanning beds  A history of many blistering sunburns as a child or teen  A family history of skin cancer  Been exposed to radiation or chemicals  A weakened immune system  Been exposed to arsenic  If you've had skin cancer in the past, you're at high risk of having it again.   How to check your skin  Do your monthly skin checkups in front of a full-length mirror. Use a room with good lighting so it's easier to see. Use a hand mirror to look at hard-to-see places like your buttocks and back. You can also have a trusted friend or family member help you with these checks. Check every part of your body, including your:   Head (ears, face, neck, and scalp)  Torso (front, back, sides, and under breasts)  Arms (tops, undersides, and armpits)  Hands (palms, backs, and fingers, including under the nails)  Lower back, buttocks, and genitals  Legs (front, back, and sides)  Feet (tops, soles, toes, including under the nails, and between toes)  Watch for new spots on your skin or a spot that's changing in color,  shape, size.   If you have a lot of moles, take digital photos of them each month. Make sure to take photos both up close and from a distance. These can help you see if any moles change over time.   Know your skin  Most skin changes aren't cancer. But if you see any changes in your skin, call your healthcare provider right away. Only they can tell you if a change is a problem. If you have skin cancer, seeing your provider can be the first step to getting the treatment that could save your life.   [employer] last reviewed this educational content on 10/1/2021    6125-7109 [employer]. All rights reserved. This information is not intended as a substitute for professional medical care. Always follow your healthcare professional's instructions.     Wound Care After a Biopsy    What is a skin biopsy?  A skin biopsy allows the doctor to examine a very small piece of tissue under the microscope to determine the diagnosis and the best treatment for the skin condition. A local anesthetic (numbing medicine) is injected with a very small needle into the skin area to be tested. A small piece of skin is taken from the area. Sometimes a suture (stitch) is used.     What are the risks of a skin biopsy?  I will experience scar, bleeding, swelling, pain, crusting and redness. I may experience incomplete removal or recurrence. Risks of this procedure are excessive bleeding, bruising, infection, nerve damage, numbness, thick (hypertrophic or keloidal) scar and non-diagnostic biopsy.    How should I care for my wound for the first 24 hours?  Keep the wound dry and covered for 24 hours  If it bleeds, hold direct pressure on the area for 15 minutes. If bleeding does not stop, call us or go to the emergency room  Avoid strenuous exercise the first 1-2 days or as your doctor instructs you    How should I care for the wound after 24 hours?  After 24 hours, remove the bandage  You may bathe or shower as normal  If you had a scalp  biopsy, you can shampoo as usual and can use shower water to clean the biopsy site daily  Clean the wound once a day with gentle soap and water  Do not scrub, be gentle  Apply white petroleum/Vaseline after cleaning the wound with a cotton swab or a clean finger, and keep the site covered with a Bandaid /bandage. Bandages are not necessary with a scalp biopsy  If you are unable to cover the site with a Bandaid /bandage, re-apply ointment 2-3 times a day to keep the site moist. Moisture will help with healing  Avoid strenuous activity for first 1-2 days  Avoid lakes, rivers, pools, and oceans until the stitches are removed or the site is healed    How do I clean my wound?  Wash hands thoroughly with soap or use hand  before all wound care  Clean the wound with gentle soap and water  Apply white petroleum/Vaseline  to wound after it is clean  Replace the Bandaid /bandage to keep the wound covered for the first few days or as instructed by your doctor  If you had a scalp biopsy, warm shower water to the area on a daily basis should suffice    What should I use to clean my wound?   Cotton-tipped applicators (Qtips )  White petroleum jelly (Vaseline ). Use a clean new container and use Q-tips to apply.  Bandaids  as needed  Gentle soap     How should I care for my wound long term?  Do not get your wound dirty  Keep up with wound care for one week or until the area is healed.  A small scab will form and fall off by itself when the area is completely healed. The area will be red and will become pink in color as it heals. Sun protection is very important for how your scar will turn out. Sunscreen with an SPF 30 or greater is recommended once the area is healed.  You should have some soreness but it should be mild and slowly go away over several days. Talk to your doctor about using tylenol for pain,    When should I call my doctor?  If you have increased:   Pain or swelling  Pus or drainage (clear or slightly  yellow drainage is ok)  Temperature over 100F  Spreading redness or warmth around wound    When will I hear about my results?  The biopsy results can take 2 weeks to come back.  Your results will automatically release to [employer] before your provider has even reviewed them.  The clinic will call you with the results, send you a [employer] message, or have you schedule a follow-up clinic or phone time to discuss the results.  Contact our clinics if you do not hear from us in 2 weeks.    Who should I call with questions?  [facility]: [phone]  [facility]: [phone]  For urgent needs outside of business hours call the [facility] at [phone] and ask for the dermatology resident on call

## 2024-06-10 NOTE — PROGRESS NOTES
Lidocaine-epinephrine 1-1:343131 % injection   0.5mL once for one use, starting 6/10/2024 ending 6/10/2024,  2mL disp, R-0, injection  Injected by JIAN Mccann - Dermatology

## 2024-06-10 NOTE — PROGRESS NOTES
[facility] Dermatology Note  Encounter Date: Aguilar 10, 2024  Office Visit     Dermatology Problem List:  1. Photoaging  - current tx: Tretinoin 0.025% cream  2. NUB x1  - s/p shave bx 06/10/2024    ____________________________________________    Assessment & Plan:    # Photoaging, face.   - Start tretinoin 0.025% cream to face. Instructed to apply topical acne medication once every other day and increase to nightly as tolerate.  Waiting 20-30 minutes after washing affected area(s) will decrease irritation. Method of application, side effects and expected results were discussed. The patient will apply pea size amount to the entire face, avoid areas around the eyes, corners of nose and mouth. Discussed side effects including photosensitivity and irritation.    # Neoplasm of unspecified behavior of the skin (D49.2) on the L breast. The differential diagnosis includes dysplasia.   - Shave biopsy performed today (see procedure note(s) below).    # Skin cancer screening with multiple benign nevi, solar lentigines  - ABCDEs: Counseled ABCDEs of melanoma: Asymmetry, Border (irregularity), Color (not uniform, changes in color), Diameter (greater than 6 mm which is about the size of a pencil eraser), and Evolving (any changes in preexisting moles).  - Sun protection: Counseled SPF30+ sunscreen, UPF clothing, sun avoidance, tanning bed avoidance.    # Cherry angiomas and seborrheic keratoses,  Benign, reassurance given.       Procedures Performed:   - Shave biopsy procedure note, location(s): L breast. After discussion of benefits and risks including but not limited to bleeding, infection, scar, incomplete removal, recurrence, and non-diagnostic biopsy, written consent and photographs were obtained. The area was cleaned with isopropyl alcohol. 0.5mL of 1% lidocaine with epinephrine was injected to obtain adequate anesthesia of lesion(s). Shave biopsy at site(s) performed. Hemostasis was achieved with  aluminium chloride. Petrolatum ointment and a sterile dressing were applied. The patient tolerated the procedure and no complications were noted. The patient was provided with verbal and written post care instructions.     Follow-up: 6 month(s) in-person to recheck chest and tretinoin effectiveness, or earlier for new or changing lesions    Staff and Scribe:   Scribe Disclosure:   By signing my name below, I, Gabi Luis Fernando, attest that this documentation has been prepared under the direction and in the presence of Velma Paz PA-C.  - Electronically Signed: Gabi Golud 06/10/24       Provider Disclosure:  I agree with above History, Review of Systems, Physical exam and Plan.  I have reviewed the content of the documentation and have edited it as needed. I have personally performed the services documented here and the documentation accurately represents those services and the decisions I have made.      Electronically signed by:  All risks, benefits and alternatives were discussed with patient.  Patient is in agreement and understands the assessment and plan.  All questions were answered.  Sun Screen Education was given.   Return to Clinic in 6 months or sooner as needed.   Velma Paz PA-C        ____________________________________________    CC: Skin Check (Yolette is here today for a skin check )    HPI:  Ms. Yolette Hayward is a(n) 49 year old female who presents today as a new patient for [facility]. Patient has fhx of skin cancer, specifically BCC in mother. Patient notes fhx of SK and cherry angiomas.    Patient reports some areas that have no abnormal borders. She spends a lot of time outdoors being active, and wears sunscreen year round. Patient notes some minimal tanning bed use during her teen years.    Patient is otherwise feeling well, without additional skin concerns.    Labs Reviewed:  None reviewed.    Physical exam:  Vitals: There were no vitals taken for this visit.  GEN: This is a well  developed, well-nourished female in no acute distress, in a pleasant mood.    SKIN: Full skin, which includes the head/face, both arms, chest, back, abdomen,both legs, genitalia and/or groin buttocks, digits and/or nails, was examined.  - 7 mm brown and variegated macule on the left breast.   - There are fine lines and dyspigmentation on sun exposed areas of the face and chest.  - There are dome shaped bright red papules on the head/neck, trunk, extremities.   - Multiple regular brown pigmented macules and papules are identified on the head/neck, trunk, extremities.   - Scattered brown macules on sun exposed areas.  - There are waxy stuck on tan to brown papules on the head/neck, trunk, extremities.  - No other lesions of concern on areas examined.               Medications:  Current Outpatient Medications   Medication Sig Dispense Refill    acyclovir (ZOVIRAX) 5 % external ointment Apply topically 6 times daily 15 g 3    cyanocobalamin (VITAMIN B-12) 1000 MCG tablet Take 1,000 mcg by mouth daily      escitalopram (LEXAPRO) 20 MG tablet Take 1 tablet (20 mg) by mouth daily 90 tablet 4    Omega-3 Fatty Acids (FISH OIL PO)       paragard intrauterine copper device 1 each by Intrauterine route once      Turmeric (QC TUMERIC COMPLEX PO)       VITAMIN D PO        No current facility-administered medications for this visit.      Past Medical History:   Patient Active Problem List   Diagnosis    Major depressive disorder, recurrent episode, mild with anxious distress (H24)     Past Medical History:   Diagnosis Date    Major depressive disorder, recurrent episode, mild with anxious distress (H24) 06/19/2023        CC Referred Self, MD  No address on file on close of this encounter.

## 2024-06-10 NOTE — NURSING NOTE
Dermatology Rooming Note    Yolette Hayward's goals for this visit include:   Chief Complaint   Patient presents with    Skin Check     Yolette is here today for a skin check      JIAN Mccann - Dermatology

## 2024-06-10 NOTE — LETTER
6/10/2024       RE: Yolette Hayward  [address]  [facility] [address]     Dear Colleague,    Thank you for referring your patient, Yolette Hayward, to the [facility] DERMATOLOG[facility] [place] [facility]. Please see a copy of my visit note below.    [facility] Dermatology Note  Encounter Date: Aguilar 10, 2024  Office Visit     Dermatology Problem List:  1. Photoaging  - current tx: Tretinoin 0.025% cream  2. NUB x1  - s/p shave bx 06/10/2024    ____________________________________________    Assessment & Plan:    # Photoaging, face.   - Start tretinoin 0.025% cream to face. Instructed to apply topical acne medication once every other day and increase to nightly as tolerate.  Waiting 20-30 minutes after washing affected area(s) will decrease irritation. Method of application, side effects and expected results were discussed. The patient will apply pea size amount to the entire face, avoid areas around the eyes, corners of nose and mouth. Discussed side effects including photosensitivity and irritation.    # Neoplasm of unspecified behavior of the skin (D49.2) on the L breast. The differential diagnosis includes dysplasia.   - Shave biopsy performed today (see procedure note(s) below).    # Skin cancer screening with multiple benign nevi, solar lentigines  - ABCDEs: Counseled ABCDEs of melanoma: Asymmetry, Border (irregularity), Color (not uniform, changes in color), Diameter (greater than 6 mm which is about the size of a pencil eraser), and Evolving (any changes in preexisting moles).  - Sun protection: Counseled SPF30+ sunscreen, UPF clothing, sun avoidance, tanning bed avoidance.    # Cherry angiomas and seborrheic keratoses,  Benign, reassurance given.       Procedures Performed:   - Shave biopsy procedure note, location(s): L breast. After discussion of benefits and risks including but not limited to bleeding, infection, scar,  incomplete removal, recurrence, and non-diagnostic biopsy, written consent and photographs were obtained. The area was cleaned with isopropyl alcohol. 0.5mL of 1% lidocaine with epinephrine was injected to obtain adequate anesthesia of lesion(s). Shave biopsy at site(s) performed. Hemostasis was achieved with aluminium chloride. Petrolatum ointment and a sterile dressing were applied. The patient tolerated the procedure and no complications were noted. The patient was provided with verbal and written post care instructions.     Follow-up: 6 month(s) in-person to recheck chest and tretinoin effectiveness, or earlier for new or changing lesions    Staff and Scribe:   Scribe Disclosure:   By signing my name below, I, Gabi Gould, attest that this documentation has been prepared under the direction and in the presence of Velma Paz PA-C.  - Electronically Signed: Gabi Gould 06/10/24       Provider Disclosure:  I agree with above History, Review of Systems, Physical exam and Plan.  I have reviewed the content of the documentation and have edited it as needed. I have personally performed the services documented here and the documentation accurately represents those services and the decisions I have made.      Electronically signed by:  All risks, benefits and alternatives were discussed with patient.  Patient is in agreement and understands the assessment and plan.  All questions were answered.  Sun Screen Education was given.   Return to Clinic in 6 months or sooner as needed.   Velma Paz PA-C        ____________________________________________    CC: Skin Check (Yolette is here today for a skin check )    HPI:  Ms. Yolette Hayward is a(n) 49 year old female who presents today as a new patient for [facility]. Patient has fhx of skin cancer, specifically BCC in mother. Patient notes fhx of SK and cherry angiomas.    Patient reports some areas that have no abnormal borders. She spends a lot of time outdoors  being active, and wears sunscreen year round. Patient notes some minimal tanning bed use during her teen years.    Patient is otherwise feeling well, without additional skin concerns.    Labs Reviewed:  None reviewed.    Physical exam:  Vitals: There were no vitals taken for this visit.  GEN: This is a well developed, well-nourished female in no acute distress, in a pleasant mood.    SKIN: Full skin, which includes the head/face, both arms, chest, back, abdomen,both legs, genitalia and/or groin buttocks, digits and/or nails, was examined.  - 7 mm brown and variegated macule on the left breast.   - There are fine lines and dyspigmentation on sun exposed areas of the face and chest.  - There are dome shaped bright red papules on the head/neck, trunk, extremities.   - Multiple regular brown pigmented macules and papules are identified on the head/neck, trunk, extremities.   - Scattered brown macules on sun exposed areas.  - There are waxy stuck on tan to brown papules on the head/neck, trunk, extremities.  - No other lesions of concern on areas examined.               Medications:  Current Outpatient Medications   Medication Sig Dispense Refill    acyclovir (ZOVIRAX) 5 % external ointment Apply topically 6 times daily 15 g 3    cyanocobalamin (VITAMIN B-12) 1000 MCG tablet Take 1,000 mcg by mouth daily      escitalopram (LEXAPRO) 20 MG tablet Take 1 tablet (20 mg) by mouth daily 90 tablet 4    Omega-3 Fatty Acids (FISH OIL PO)       paragard intrauterine copper device 1 each by Intrauterine route once      Turmeric (QC TUMERIC COMPLEX PO)       VITAMIN D PO        No current facility-administered medications for this visit.      Past Medical History:   Patient Active Problem List   Diagnosis    Major depressive disorder, recurrent episode, mild with anxious distress (H24)     Past Medical History:   Diagnosis Date    Major depressive disorder, recurrent episode, mild with anxious distress (H24) 06/19/2023        CC  Referred Self,    Lidocaine-epinephrine 1-1:[id number] % injection   0.5mL once for one use, starting 6/10/2024 ending 6/10/2024,  2mL disp, R-0, injection  Injected by JIAN Mccann - Dermatology

## 2024-06-12 LAB
PATH REPORT.COMMENTS IMP SPEC: NORMAL
PATH REPORT.COMMENTS IMP SPEC: NORMAL
PATH REPORT.FINAL DX SPEC: NORMAL
PATH REPORT.GROSS SPEC: NORMAL
PATH REPORT.MICROSCOPIC SPEC OTHER STN: NORMAL
PATH REPORT.RELEVANT HX SPEC: NORMAL

## 2024-06-13 ENCOUNTER — HOSPITAL ENCOUNTER (OUTPATIENT)
Dept: MAMMOGRAPHY | Facility: CLINIC | Age: 49
Discharge: HOME | End: 2024-06-13
Attending: FAMILY MEDICINE | Admitting: FAMILY MEDICINE
Payer: COMMERCIAL

## 2024-06-13 DIAGNOSIS — Z12.31 VISIT FOR SCREENING MAMMOGRAM: ICD-10-CM

## 2024-06-13 PROCEDURE — 77063 BREAST TOMOSYNTHESIS BI: CPT

## 2024-06-17 ENCOUNTER — TELEPHONE (OUTPATIENT)
Dept: DERMATOLOGY | Facility: CLINIC | Age: 49
End: 2024-06-17
Payer: COMMERCIAL

## 2024-06-17 NOTE — TELEPHONE ENCOUNTER
Left Voicemail (1st Attempt) and Sent Mychart (1st Attempt) for the patient to call back and schedule the following:    Appointment type: FOLLOW UP    Provider: Velma    Return date: 12/2024     Specialty phone number: [phone]    Additonal Notes: na

## 2024-06-19 ENCOUNTER — TELEPHONE (OUTPATIENT)
Dept: DERMATOLOGY | Facility: CLINIC | Age: 49
End: 2024-06-19
Payer: COMMERCIAL

## 2024-06-20 ENCOUNTER — HOSPITAL ENCOUNTER (OUTPATIENT)
Dept: MAMMOGRAPHY | Facility: CLINIC | Age: 49
Discharge: HOME | End: 2024-06-20
Attending: FAMILY MEDICINE
Payer: COMMERCIAL

## 2024-06-20 DIAGNOSIS — R92.8 ABNORMAL MAMMOGRAM: ICD-10-CM

## 2024-06-20 PROCEDURE — 77065 DX MAMMO INCL CAD UNI: CPT | Mod: RT

## 2025-01-08 ENCOUNTER — LAB REQUISITION (OUTPATIENT)
Dept: LAB | Facility: CLINIC | Age: 50
End: 2025-01-08

## 2025-01-08 LAB — SARS-COV-2 RNA RESP QL NAA+PROBE: NEGATIVE

## 2025-01-08 PROCEDURE — 87635 SARS-COV-2 COVID-19 AMP PRB: CPT | Performed by: INTERNAL MEDICINE

## 2025-03-13 ENCOUNTER — ANCILLARY PROCEDURE (OUTPATIENT)
Dept: GENERAL RADIOLOGY | Facility: CLINIC | Age: 50
End: 2025-03-13
Attending: PHYSICIAN ASSISTANT
Payer: COMMERCIAL

## 2025-03-13 ENCOUNTER — OFFICE VISIT (OUTPATIENT)
Dept: URGENT CARE | Facility: URGENT CARE | Age: 50
End: 2025-03-13
Payer: COMMERCIAL

## 2025-03-13 VITALS
TEMPERATURE: 98.7 F | DIASTOLIC BLOOD PRESSURE: 74 MMHG | HEART RATE: 74 BPM | RESPIRATION RATE: 16 BRPM | OXYGEN SATURATION: 100 % | BODY MASS INDEX: 20.9 KG/M2 | WEIGHT: 100 LBS | SYSTOLIC BLOOD PRESSURE: 115 MMHG

## 2025-03-13 DIAGNOSIS — B96.89 ACUTE BACTERIAL BRONCHITIS: ICD-10-CM

## 2025-03-13 DIAGNOSIS — J20.8 ACUTE BACTERIAL BRONCHITIS: ICD-10-CM

## 2025-03-13 DIAGNOSIS — R09.89 CHEST CONGESTION: ICD-10-CM

## 2025-03-13 DIAGNOSIS — R53.83 OTHER FATIGUE: Primary | ICD-10-CM

## 2025-03-13 DIAGNOSIS — R05.9 COUGH, UNSPECIFIED TYPE: ICD-10-CM

## 2025-03-13 LAB
ALBUMIN SERPL-MCNC: 3.9 G/DL (ref 3.4–5)
ALP SERPL-CCNC: 50 U/L (ref 40–150)
ALT SERPL W P-5'-P-CCNC: 14 U/L (ref 0–50)
ANION GAP SERPL CALCULATED.3IONS-SCNC: 10 MMOL/L (ref 3–14)
AST SERPL W P-5'-P-CCNC: 29 U/L (ref 0–45)
BASOPHILS # BLD AUTO: 0 10E3/UL (ref 0–0.2)
BASOPHILS NFR BLD AUTO: 0 %
BILIRUB SERPL-MCNC: 0.7 MG/DL (ref 0.2–1.3)
BUN SERPL-MCNC: 11 MG/DL (ref 7–30)
CALCIUM SERPL-MCNC: 10.3 MG/DL (ref 8.5–10.1)
CHLORIDE BLD-SCNC: 102 MMOL/L (ref 94–109)
CO2 SERPL-SCNC: 27 MMOL/L (ref 20–32)
CREAT SERPL-MCNC: 0.8 MG/DL (ref 0.52–1.04)
EGFRCR SERPLBLD CKD-EPI 2021: 90 ML/MIN/1.73M2
EOSINOPHIL # BLD AUTO: 0.1 10E3/UL (ref 0–0.7)
EOSINOPHIL NFR BLD AUTO: 2 %
ERYTHROCYTE [DISTWIDTH] IN BLOOD BY AUTOMATED COUNT: 12.4 % (ref 10–15)
GLUCOSE BLD-MCNC: 88 MG/DL (ref 70–99)
HCT VFR BLD AUTO: 37.4 % (ref 35–47)
HGB BLD-MCNC: 12.1 G/DL (ref 11.7–15.7)
IMM GRANULOCYTES # BLD: 0 10E3/UL
IMM GRANULOCYTES NFR BLD: 0 %
LYMPHOCYTES # BLD AUTO: 2 10E3/UL (ref 0.8–5.3)
LYMPHOCYTES NFR BLD AUTO: 35 %
MCH RBC QN AUTO: 29.8 PG (ref 26.5–33)
MCHC RBC AUTO-ENTMCNC: 32.4 G/DL (ref 31.5–36.5)
MCV RBC AUTO: 92 FL (ref 78–100)
MONOCYTES # BLD AUTO: 0.3 10E3/UL (ref 0–1.3)
MONOCYTES NFR BLD AUTO: 5 %
NEUTROPHILS # BLD AUTO: 3.3 10E3/UL (ref 1.6–8.3)
NEUTROPHILS NFR BLD AUTO: 58 %
PLATELET # BLD AUTO: 279 10E3/UL (ref 150–450)
POTASSIUM BLD-SCNC: 4.4 MMOL/L (ref 3.4–5.3)
PROT SERPL-MCNC: 7.3 G/DL (ref 6.8–8.8)
RBC # BLD AUTO: 4.06 10E6/UL (ref 3.8–5.2)
SODIUM SERPL-SCNC: 139 MMOL/L (ref 135–145)
WBC # BLD AUTO: 5.8 10E3/UL (ref 4–11)

## 2025-03-13 RX ORDER — PROPRANOLOL HYDROCHLORIDE 10 MG/1
TABLET ORAL
COMMUNITY
Start: 2024-10-22

## 2025-03-13 RX ORDER — BENZONATATE 200 MG/1
200 CAPSULE ORAL 3 TIMES DAILY PRN
Qty: 30 CAPSULE | Refills: 0 | Status: SHIPPED | OUTPATIENT
Start: 2025-03-13

## 2025-03-13 RX ORDER — DOXYCYCLINE HYCLATE 100 MG
100 TABLET ORAL 2 TIMES DAILY
Qty: 20 TABLET | Refills: 0 | Status: SHIPPED | OUTPATIENT
Start: 2025-03-13 | End: 2025-03-23

## 2025-03-13 RX ORDER — BUSPIRONE HYDROCHLORIDE 5 MG/1
5 TABLET ORAL 2 TIMES DAILY
COMMUNITY

## 2025-03-13 NOTE — PROGRESS NOTES
Assessment & Plan     Other fatigue    CBC neg for anemia  CMP neg for renal, hepatic or electrolyte imbalance  Ferritin pending  Vit B 12 pending  TSH pending    - Comprehensive metabolic panel (BMP + Alb, Alk Phos, ALT, AST, Total. Bili, TP)  - TSH with free T4 reflex  - Ferritin  - Vitamin B12      Chest congestion    Ongoing, some concern about bacterial infection with how long this is lasting  Discussed resp panel but due to costs we have decided to forgo this test    Chest xray Negative for acute findings, read by Raman Zepeda PA-C [facility] at time of visit.  CBC is normal    - XR Chest 2 Views  - CBC with platelets and differential  - Comprehensive metabolic panel (BMP + Alb, Alk Phos, ALT, AST, Total. Bili, TP)  - CBC with platelets and differential  - Comprehensive metabolic panel (BMP + Alb, Alk Phos, ALT, AST, Total. Bili, TP)    Acute bacterial bronchitis    Bronchitis is inflammation of the bronchial tubes, which carry air to the lungs. The tubes swell and produce mucus, or phlegm. The mucus and inflamed bronchial tubes make you cough. You may have trouble breathing.  Most cases of bronchitis are caused by viruses but in your case we are more concerned about a bacterial infection. . Antibiotics usually are helpful in this situation to help you clear this bacterial infection.  Bronchitis usually develops rapidly and lasts about 2 to 3 weeks in otherwise healthy people.    - doxycycline hyclate (VIBRA-TABS) 100 MG tablet  Dispense: 20 tablet; Refill: 0    Cough, unspecified type    Tessalon for coughing  - benzonatate (TESSALON) 200 MG capsule  Dispense: 30 capsule; Refill: 0       At today's visit with Yolette Hayward , we discussed results, diagnosis, medications and formulated a plan.  We also discussed red flags for immediate return to clinic/ER, as well as indications for follow up with PCP if not improved in 3 days. Patient understood and agreed to plan. Yolette Hayward was discharged with stable vitals and has  no further questions.       No follow-ups on file.    Raman Zepeda, [facility], PA-C  [facility] NEERAJ De La Vega is a 49 year old female who presents to clinic today for the following health issues:  Chief Complaint   Patient presents with    Urgent Care    URI     X 11 days cold sx   Deep productive cough x 1 week and fatigue   Patient work as  at [employer]   Would like a respiratory panel   Would like B12 + Vit D  ferritin iron levels checked and CBC labs done.        HPI  Review of Systems  Constitutional, HEENT, cardiovascular, pulmonary, GI, , musculoskeletal, neuro, skin, endocrine and psych systems are negative, except as otherwise noted.      Objective    /74 (BP Location: Left arm, Patient Position: Sitting, Cuff Size: Adult Small)   Pulse 74   Temp 98.7  F (37.1  C) (Tympanic)   Resp 16   Wt 45.4 kg (100 lb)   LMP 02/27/2025 (Approximate)   SpO2 100%   Breastfeeding No   BMI 20.90 kg/m    Physical Exam   GENERAL: alert and no distress  EYES: Eyes grossly normal to inspection, PERRL and conjunctivae and sclerae normal  HENT: ear canals and TM's normal, nose and mouth without ulcers or lesions  NECK: no adenopathy, no asymmetry, masses, or scars  RESP: lungs clear to auscultation - no rales, rhonchi or wheezes  CV: regular rate and rhythm, normal S1 S2, no S3 or S4, no murmur, click or rub, no peripheral edema  MS: no gross musculoskeletal defects noted, no edema  SKIN: no suspicious lesions or rashes  NEURO: Normal strength and tone, mentation intact and speech normal  PSYCH: mentation appears normal, affect normal/bright

## 2025-03-15 LAB — VIT B12 SERPL-MCNC: 1000 PG/ML (ref 232–1245)

## 2025-04-11 ENCOUNTER — OFFICE VISIT (OUTPATIENT)
Dept: FAMILY MEDICINE | Facility: CLINIC | Age: 50
End: 2025-04-11
Attending: FAMILY MEDICINE
Payer: COMMERCIAL

## 2025-04-11 ENCOUNTER — LAB (OUTPATIENT)
Dept: LAB | Facility: CLINIC | Age: 50
End: 2025-04-11
Attending: FAMILY MEDICINE
Payer: COMMERCIAL

## 2025-04-11 VITALS
WEIGHT: 100.9 LBS | DIASTOLIC BLOOD PRESSURE: 76 MMHG | BODY MASS INDEX: 20.34 KG/M2 | SYSTOLIC BLOOD PRESSURE: 107 MMHG | HEIGHT: 59 IN | HEART RATE: 84 BPM

## 2025-04-11 DIAGNOSIS — Z11.3 ROUTINE SCREENING FOR STI (SEXUALLY TRANSMITTED INFECTION): ICD-10-CM

## 2025-04-11 DIAGNOSIS — Z11.59 NEED FOR HEPATITIS C SCREENING TEST: ICD-10-CM

## 2025-04-11 DIAGNOSIS — Z00.00 ROUTINE GENERAL MEDICAL EXAMINATION AT A HEALTH CARE FACILITY: Primary | ICD-10-CM

## 2025-04-11 DIAGNOSIS — Z13.1 SCREENING FOR DIABETES MELLITUS: ICD-10-CM

## 2025-04-11 DIAGNOSIS — Z78.9 VEGAN DIET: ICD-10-CM

## 2025-04-11 LAB
ANION GAP SERPL CALCULATED.3IONS-SCNC: 10 MMOL/L (ref 7–15)
BUN SERPL-MCNC: 9.5 MG/DL (ref 6–20)
CALCIUM SERPL-MCNC: 9.3 MG/DL (ref 8.8–10.4)
CHLORIDE SERPL-SCNC: 98 MMOL/L (ref 98–107)
CREAT SERPL-MCNC: 0.69 MG/DL (ref 0.51–0.95)
EGFRCR SERPLBLD CKD-EPI 2021: >90 ML/MIN/1.73M2
EST. AVERAGE GLUCOSE BLD GHB EST-MCNC: 108 MG/DL
FOLATE SERPL-MCNC: 24.4 NG/ML (ref 4.6–34.8)
GLUCOSE SERPL-MCNC: 88 MG/DL (ref 70–99)
HBA1C MFR BLD: 5.4 %
HCO3 SERPL-SCNC: 26 MMOL/L (ref 22–29)
HCV AB SERPL QL IA: NONREACTIVE
POTASSIUM SERPL-SCNC: 3.9 MMOL/L (ref 3.4–5.3)
SODIUM SERPL-SCNC: 134 MMOL/L (ref 135–145)

## 2025-04-11 PROCEDURE — 82746 ASSAY OF FOLIC ACID SERUM: CPT

## 2025-04-11 PROCEDURE — 87591 N.GONORRHOEAE DNA AMP PROB: CPT

## 2025-04-11 PROCEDURE — 82374 ASSAY BLOOD CARBON DIOXIDE: CPT

## 2025-04-11 PROCEDURE — 36415 COLL VENOUS BLD VENIPUNCTURE: CPT

## 2025-04-11 PROCEDURE — 90715 TDAP VACCINE 7 YRS/> IM: CPT

## 2025-04-11 PROCEDURE — 90471 IMMUNIZATION ADMIN: CPT

## 2025-04-11 PROCEDURE — 86803 HEPATITIS C AB TEST: CPT

## 2025-04-11 PROCEDURE — 83036 HEMOGLOBIN GLYCOSYLATED A1C: CPT

## 2025-04-11 PROCEDURE — 87491 CHLMYD TRACH DNA AMP PROBE: CPT

## 2025-04-11 PROCEDURE — 250N000011 HC RX IP 250 OP 636

## 2025-04-11 PROCEDURE — 80048 BASIC METABOLIC PNL TOTAL CA: CPT

## 2025-04-11 PROCEDURE — 99213 OFFICE O/P EST LOW 20 MIN: CPT | Performed by: FAMILY MEDICINE

## 2025-04-11 PROCEDURE — 3074F SYST BP LT 130 MM HG: CPT | Performed by: FAMILY MEDICINE

## 2025-04-11 PROCEDURE — 99396 PREV VISIT EST AGE 40-64: CPT | Performed by: FAMILY MEDICINE

## 2025-04-11 PROCEDURE — 3078F DIAST BP <80 MM HG: CPT | Performed by: FAMILY MEDICINE

## 2025-04-11 PROCEDURE — 82565 ASSAY OF CREATININE: CPT

## 2025-04-11 SDOH — HEALTH STABILITY: PHYSICAL HEALTH: ON AVERAGE, HOW MANY DAYS PER WEEK DO YOU ENGAGE IN MODERATE TO STRENUOUS EXERCISE (LIKE A BRISK WALK)?: 4 DAYS

## 2025-04-11 SDOH — HEALTH STABILITY: PHYSICAL HEALTH: ON AVERAGE, HOW MANY MINUTES DO YOU ENGAGE IN EXERCISE AT THIS LEVEL?: 50 MIN

## 2025-04-11 ASSESSMENT — SOCIAL DETERMINANTS OF HEALTH (SDOH): HOW OFTEN DO YOU GET TOGETHER WITH FRIENDS OR RELATIVES?: TWICE A WEEK
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
## 2025-04-11 NOTE — PATIENT INSTRUCTIONS
Patient Education   Preventive Care Advice   This is general advice given by our system to help you stay healthy. However, your care team may have specific advice just for you. Please talk to your care team about your preventive care needs.  Nutrition  Eat 5 or more servings of fruits and vegetables each day.  Try wheat bread, brown rice and whole grain pasta (instead of white bread, rice, and pasta).  Get enough calcium and vitamin D. Check the label on foods and aim for 100% of the RDA (recommended daily allowance).  Lifestyle  Exercise at least 150 minutes each week  (30 minutes a day, 5 days a week).  Do muscle strengthening activities 2 days a week. These help control your weight and prevent disease.  No smoking.  Wear sunscreen to prevent skin cancer.  Have a dental exam and cleaning every 6 months.  Yearly exams  See your health care team every year to talk about:  Any changes in your health.  Any medicines your care team has prescribed.  Preventive care, family planning, and ways to prevent chronic diseases.  Shots (vaccines)   HPV shots (up to age 26), if you've never had them before.  Hepatitis B shots (up to age 59), if you've never had them before.  COVID-19 shot: Get this shot when it's due.  Flu shot: Get a flu shot every year.  Tetanus shot: Get a tetanus shot every 10 years.  Pneumococcal, hepatitis A, and RSV shots: Ask your care team if you need these based on your risk.  Shingles shot (for age 50 and up)  General health tests  Diabetes screening:  Starting at age 35, Get screened for diabetes at least every 3 years.  If you are younger than age 35, ask your care team if you should be screened for diabetes.  Cholesterol test: At age 39, start having a cholesterol test every 5 years, or more often if advised.  Bone density scan (DEXA): At age 50, ask your care team if you should have this scan for osteoporosis (brittle bones).  Hepatitis C: Get tested at least once in your life.  STIs (sexually  transmitted infections)  Before age 24: Ask your care team if you should be screened for STIs.  After age 24: Get screened for STIs if you're at risk. You are at risk for STIs (including HIV) if:  You are sexually active with more than one person.  You don't use condoms every time.  You or a partner was diagnosed with a sexually transmitted infection.  If you are at risk for HIV, ask about PrEP medicine to prevent HIV.  Get tested for HIV at least once in your life, whether you are at risk for HIV or not.  Cancer screening tests  Cervical cancer screening: If you have a cervix, begin getting regular cervical cancer screening tests starting at age 21.  Breast cancer scan (mammogram): If you've ever had breasts, begin having regular mammograms starting at age 40. This is a scan to check for breast cancer.  Colon cancer screening: It is important to start screening for colon cancer at age 45.  Have a colonoscopy test every 10 years (or more often if you're at risk) Or, ask your provider about stool tests like a FIT test every year or Cologuard test every 3 years.  To learn more about your testing options, visit:   .  For help making a decision, visit:   https://bit.ly/jq47428.  Prostate cancer screening test: If you have a prostate, ask your care team if a prostate cancer screening test (PSA) at age 55 is right for you.  Lung cancer screening: If you are a current or former smoker ages 50 to 80, ask your care team if ongoing lung cancer screenings are right for you.  For informational purposes only. Not to replace the advice of your health care provider. Copyright   2023 [employer]. All rights reserved. Clinically reviewed by the [facility] Transitions Program. [employer] [id number] - REV 01/24.

## 2025-04-12 LAB
C TRACH DNA SPEC QL NAA+PROBE: NEGATIVE
N GONORRHOEA DNA SPEC QL NAA+PROBE: NEGATIVE
SPECIMEN TYPE: NORMAL
SPECIMEN TYPE: NORMAL

## 2025-04-29 NOTE — PATIENT INSTRUCTIONS
Cryotherapy    What is it?  Use of a very cold liquid, such as liquid nitrogen, to freeze and destroy abnormal skin cells that need to be removed    What should I expect?  Tenderness and redness  A small blister that might grow and fill with dark purple blood. There may be crusting.  More than one treatment may be needed if the lesions do not go away.    How do I care for the treated area?  Gently wash the area with your hands when bathing.  Use a thin layer of Vaseline to help with healing. You may use a Band-Aid.   The area should heal within 7-10 days and may leave behind a pink or lighter color.   Do not use an antibiotic or Neosporin ointment.   You may take acetaminophen (Tylenol) for pain.     Call your doctor if you have:  Severe pain  Signs of infection (warmth, redness, cloudy yellow drainage, and or a bad smell)  Questions or concerns    Who should I call with questions?      [facility]: [phone]      [facility]: [phone]      For urgent needs outside of business hours call the [facility] at [phone] and ask for the dermatology resident on call       Checking for Skin Cancer  You can help find cancer early by checking your skin each month. There are 3 main kinds of skin cancer: melanoma, basal cell carcinoma, and squamous cell carcinoma. Doing monthly skin checks is the best way to find new marks, sores, or skin changes. Follow these instructions for checking your skin.   The ABCDEs of checking moles for melanoma   Check your moles or growths for signs of melanoma using ABCDE:   Asymmetry: The sides of the mole or growth don t match.  Border: The edges are ragged, notched, or blurred.  Color: The color within the mole or growth varies. It could be black, brown, tan, white, or shades of red, gray, or blue.  Diameter: The mole or growth is larger than   inch or 6 mm (size of a pencil eraser).  Evolving: The size, shape,  texture, or color of the mole or growth is changing.     ABCDE's of moles on light skin.        ABCDE's of moles on dark skin may be harder to identify.     Checking for other types of skin cancer  Basal cell carcinoma or squamous cell carcinoma cause symptoms like:     A spot or mole that looks different from all other marks on your skin  Changes in how an area feels, such as itching, tenderness, or pain  Changes in the skin's surface, such as oozing, bleeding, or scaliness  A sore that doesn't heal  New swelling, redness, or spread of color beyond the border of a mole    Who s at risk?  Anyone of any skin color can get skin cancer. But you're at greater risk if you have:   Fair skin that freckles easily and burns instead of tanning  Light-colored or red hair  Light-colored eyes  Many moles or abnormal moles on your skin  A long history of unprotected exposure to sunlight or tanning beds  A history of many blistering sunburns as a child or teen  A family history of skin cancer  Been exposed to radiation or chemicals  A weakened immune system  Been exposed to arsenic  If you've had skin cancer in the past, you're at high risk of having it again.   How to check your skin  Do your monthly skin checkups in front of a full-length mirror. Use a room with good lighting so it's easier to see. Use a hand mirror to look at hard-to-see places like your buttocks and back. You can also have a trusted friend or family member help you with these checks. Check every part of your body, including your:   Head (ears, face, neck, and scalp)  Torso (front, back, sides, and under breasts)  Arms (tops, undersides, and armpits)  Hands (palms, backs, and fingers, including under the nails)  Lower back, buttocks, and genitals  Legs (front, back, and sides)  Feet (tops, soles, toes, including under the nails, and between toes)  Watch for new spots on your skin or a spot that's changing in color, shape, size.   If you have a lot of moles,  take digital photos of them each month. Make sure to take photos both up close and from a distance. These can help you see if any moles change over time.   Know your skin  Most skin changes aren't cancer. But if you see any changes in your skin, call your healthcare provider right away. Only they can tell you if a change is a problem. If you have skin cancer, seeing your provider can be the first step to getting the treatment that could save your life.   [employer] last reviewed this educational content on 10/1/2021    9081-7801 [employer]. All rights reserved. This information is not intended as a substitute for professional medical care. Always follow your healthcare professional's instructions.

## 2025-05-05 ENCOUNTER — OFFICE VISIT (OUTPATIENT)
Dept: DERMATOLOGY | Facility: CLINIC | Age: 50
End: 2025-05-05
Payer: COMMERCIAL

## 2025-05-05 DIAGNOSIS — L57.8 PHOTOAGING OF SKIN: ICD-10-CM

## 2025-05-05 DIAGNOSIS — L82.1 SEBORRHEIC KERATOSES: ICD-10-CM

## 2025-05-05 DIAGNOSIS — L81.4 SOLAR LENTIGO: ICD-10-CM

## 2025-05-05 DIAGNOSIS — D18.01 CHERRY ANGIOMA: ICD-10-CM

## 2025-05-05 DIAGNOSIS — L82.0 INFLAMED SEBORRHEIC KERATOSIS: Primary | ICD-10-CM

## 2025-05-05 DIAGNOSIS — D22.9 MULTIPLE BENIGN NEVI: ICD-10-CM

## 2025-05-05 DIAGNOSIS — Z86.018 HISTORY OF DYSPLASTIC NEVUS: ICD-10-CM

## 2025-05-05 PROCEDURE — 17110 DESTRUCTION B9 LES UP TO 14: CPT | Performed by: PHYSICIAN ASSISTANT

## 2025-05-05 PROCEDURE — 1126F AMNT PAIN NOTED NONE PRSNT: CPT | Performed by: PHYSICIAN ASSISTANT

## 2025-05-05 PROCEDURE — 99213 OFFICE O/P EST LOW 20 MIN: CPT | Mod: 25 | Performed by: PHYSICIAN ASSISTANT

## 2025-05-05 RX ORDER — TRETINOIN 0.25 MG/G
CREAM TOPICAL
Qty: 45 G | Refills: 3 | Status: SHIPPED | OUTPATIENT
Start: 2025-05-05

## 2025-05-05 RX ORDER — BUPROPION HYDROCHLORIDE 150 MG/1
TABLET ORAL
COMMUNITY
Start: 2025-04-21

## 2025-05-05 ASSESSMENT — PAIN SCALES - GENERAL: PAINLEVEL_OUTOF10: NO PAIN (0)

## 2025-05-05 NOTE — LETTER
5/5/2025       RE: Yolette Hayward  [address]  [place] [address]     Dear Colleague,    Thank you for referring your patient, Yolette Hayward, to the [facility] [place] at [facility]. Please see a copy of my visit note below.      [facility] Dermatology Note  Encounter Date: May 5, 2025  Office Visit     Dermatology Problem List:  1. Photoaging  - current tx: Tretinoin 0.025% cream  2. History of DN  - Compound dysplastic nevus with mild atypia, L breast, s/p shave bx 06/10/2024    ____________________________________________    Assessment & Plan:    # Seborrheic keratosis, inflamed. R upper chest x 1, back x 3  - Cryotherapy performed today. See procedure section.    # Cherry angioma, inflamed L postauricular area.  - Cryotherapy performed today. See procedure section.      # Photoaging, face.   - Continue tretinoin 0.025% cream to face nightly as tolerated.  Waiting 20-30 minutes after washing affected area(s) will decrease irritation. Refilled today.   Discussed Vitamin D Serum.     # History of Dysplastic Nevus. No clinical evidence recurrence.     # Skin cancer screening with multiple benign nevi, solar lentigines  - ABCDEs: Counseled ABCDEs of melanoma: Asymmetry, Border (irregularity), Color (not uniform, changes in color), Diameter (greater than 6 mm which is about the size of a pencil eraser), and Evolving (any changes in preexisting moles).  - Sun protection: Counseled SPF30+ sunscreen, UPF clothing, sun avoidance, tanning bed avoidance.     # Cherry angiomas and seborrheic keratoses,  Benign, reassurance given.     Procedures Performed:   - Cryotherapy procedure note, location(s): L postauricular area, R upper chest, back. After verbal consent and discussion of risks and benefits including, but not limited to, dyspigmentation/scar, blister, and pain, 5 lesion(s) was(were) treated with 1-2 mm freeze  border for 1-2 cycles with liquid nitrogen. Post cryotherapy instructions were provided.      Follow-up: 1 year(s) in-person, or earlier for new or changing lesions    Staff and Scribe:     Scribe Disclosure:   I, Yenny Del, am serving as a scribe to document services personally performed by Velma Paz PA-C based on data collection and the provider's statements to me.     Provider Disclosure:   The documentation recorded by the scribe accurately reflects the services I personally performed and the decisions made by me.    All risks, benefits and alternatives were discussed with patient.  Patient is in agreement and understands the assessment and plan.  All questions were answered.  Sun Screen Education was given.   Return to Clinic annually or sooner as needed.   Velma Paz PA-C   [facility]    ____________________________________________    CC: Derm Problem (FBSE- no spots of concern)    HPI:  Ms. Yolette Hayward is a(n) 50 year old female who presents today as a return patient for skin check. Last seen in dermatology 6/10/24 by me for skin check. Shave biopsy of one NUB performed on the L breast, path returning as Compound DN with mild atypia.    Today, patient reports areas of concern behind L ear and some irritated lesions on the chest and back.  She is still using tretinoin but not every day. Also using Vitamin C some mornings and hyaluronic acid some days      Patient is otherwise feeling well, without additional skin concerns.    Labs Reviewed:  N/A    Physical Exam:  Vitals: LMP 03/23/2025 (Exact Date)   SKIN: Full skin, which includes the head/face, both arms, chest, back, abdomen,both legs, genitalia and/or groin buttocks, digits and/or nails, was examined.  - Atrophic macule on the L breast without repigmentation.   - There are dome shaped bright red papules on the trunk and extremities .   - Multiple regular brown pigmented macules and papules are  identified on the trunk and extremities. .   - Scattered brown macules on sun exposed areas.  - Waxy stuck on papules and plaques on trunk and extremities.   - There is an inflamed dome shaped bright red papule on the L postauricular area x 1. .   - There are tan to brown waxy stuck on papules with surrounding erythema on the R upper chest x 1, back x 3. .   - No other lesions of concern on areas examined.     Medications:  Current Outpatient Medications   Medication Sig Dispense Refill     acyclovir (ZOVIRAX) 5 % external ointment Apply topically 6 times daily 15 g 3     buPROPion (WELLBUTRIN XL) 150 MG 24 hr tablet        busPIRone (BUSPAR) 5 MG tablet Take 5 mg by mouth 2 times daily.       cyanocobalamin (VITAMIN B-12) 1000 MCG tablet Take 1,000 mcg by mouth daily       escitalopram (LEXAPRO) 20 MG tablet Take 1 tablet (20 mg) by mouth daily 90 tablet 4     Omega-3 Fatty Acids (FISH OIL PO)        paragard intrauterine copper device 1 each by Intrauterine route once       propranolol (INDERAL) 10 MG tablet        tretinoin (RETIN-A) 0.025 % external cream Use every other night and increase to nightly as tolerated. Moisturize on top. 45 g 3     Turmeric (QC TUMERIC COMPLEX PO)        VITAMIN D PO        No current facility-administered medications for this visit.      Past Medical History:   Patient Active Problem List   Diagnosis     Major depressive disorder, recurrent episode, mild with anxious distress     Past Medical History:   Diagnosis Date     Major depressive disorder, recurrent episode, mild with anxious distress 06/19/2023        CC No referring provider defined for this encounter. on close of this encounter.      Again, thank you for allowing me to participate in the care of your patient.      Sincerely,    Velma Paz PA-C

## 2025-05-05 NOTE — NURSING NOTE
Dermatology Rooming Note    Yolette Hayward's goals for this visit include:   Chief Complaint   Patient presents with    Derm Problem     FBSE- no spots of concern     CHELSEA Garcia

## 2025-07-21 ENCOUNTER — HOSPITAL ENCOUNTER (OUTPATIENT)
Dept: MAMMOGRAPHY | Facility: CLINIC | Age: 50
Discharge: HOME | End: 2025-07-21
Attending: FAMILY MEDICINE | Admitting: FAMILY MEDICINE
Payer: COMMERCIAL

## 2025-07-21 DIAGNOSIS — Z12.31 VISIT FOR SCREENING MAMMOGRAM: ICD-10-CM

## 2025-07-21 PROCEDURE — 77067 SCR MAMMO BI INCL CAD: CPT
